# Patient Record
Sex: FEMALE | Race: BLACK OR AFRICAN AMERICAN | Employment: OTHER | URBAN - METROPOLITAN AREA
[De-identification: names, ages, dates, MRNs, and addresses within clinical notes are randomized per-mention and may not be internally consistent; named-entity substitution may affect disease eponyms.]

---

## 2019-04-22 ENCOUNTER — HOSPITAL ENCOUNTER (OUTPATIENT)
Dept: LAB | Age: 84
Discharge: HOME | End: 2019-04-22
Payer: MEDICARE

## 2019-04-22 ENCOUNTER — OFFICE VISIT (OUTPATIENT)
Dept: FAMILY MEDICINE CLINIC | Age: 84
End: 2019-04-22

## 2019-04-22 VITALS
TEMPERATURE: 98 F | HEIGHT: 59 IN | OXYGEN SATURATION: 98 % | BODY MASS INDEX: 27.82 KG/M2 | RESPIRATION RATE: 17 BRPM | HEART RATE: 73 BPM | SYSTOLIC BLOOD PRESSURE: 183 MMHG | DIASTOLIC BLOOD PRESSURE: 74 MMHG | WEIGHT: 138 LBS

## 2019-04-22 DIAGNOSIS — Z96.642 HISTORY OF TOTAL LEFT HIP REPLACEMENT: ICD-10-CM

## 2019-04-22 DIAGNOSIS — R41.3 MEMORY LOSS: ICD-10-CM

## 2019-04-22 DIAGNOSIS — Z13.220 SCREENING FOR HYPERLIPIDEMIA: ICD-10-CM

## 2019-04-22 DIAGNOSIS — R68.89 COLD INTOLERANCE: ICD-10-CM

## 2019-04-22 DIAGNOSIS — R63.5 WEIGHT GAIN: ICD-10-CM

## 2019-04-22 DIAGNOSIS — I10 ESSENTIAL HYPERTENSION: ICD-10-CM

## 2019-04-22 DIAGNOSIS — R22.43 LOCALIZED SWELLING OF BOTH LOWER LEGS: ICD-10-CM

## 2019-04-22 DIAGNOSIS — L84 CALLUS OF FOOT: ICD-10-CM

## 2019-04-22 DIAGNOSIS — M17.12 OSTEOARTHRITIS OF LEFT KNEE, UNSPECIFIED OSTEOARTHRITIS TYPE: ICD-10-CM

## 2019-04-22 DIAGNOSIS — I10 ESSENTIAL HYPERTENSION: Primary | ICD-10-CM

## 2019-04-22 LAB
ALBUMIN SERPL-MCNC: 3.7 G/DL (ref 3.4–5)
ALBUMIN/GLOB SERPL: 1.1 {RATIO} (ref 0.8–1.7)
ALP SERPL-CCNC: 84 U/L (ref 45–117)
ALT SERPL-CCNC: 14 U/L (ref 13–56)
ANION GAP SERPL CALC-SCNC: 7 MMOL/L (ref 3–18)
AST SERPL-CCNC: 13 U/L (ref 15–37)
BASOPHILS # BLD: 0 K/UL (ref 0–0.1)
BASOPHILS NFR BLD: 0 % (ref 0–2)
BILIRUB SERPL-MCNC: 0.3 MG/DL (ref 0.2–1)
BUN SERPL-MCNC: 13 MG/DL (ref 7–18)
BUN/CREAT SERPL: 15 (ref 12–20)
CALCIUM SERPL-MCNC: 8.7 MG/DL (ref 8.5–10.1)
CHLORIDE SERPL-SCNC: 106 MMOL/L (ref 100–108)
CHOLEST SERPL-MCNC: 193 MG/DL
CO2 SERPL-SCNC: 26 MMOL/L (ref 21–32)
CREAT SERPL-MCNC: 0.87 MG/DL (ref 0.6–1.3)
DIFFERENTIAL METHOD BLD: ABNORMAL
EOSINOPHIL # BLD: 0 K/UL (ref 0–0.4)
EOSINOPHIL NFR BLD: 1 % (ref 0–5)
ERYTHROCYTE [DISTWIDTH] IN BLOOD BY AUTOMATED COUNT: 14.9 % (ref 11.6–14.5)
GLOBULIN SER CALC-MCNC: 3.5 G/DL (ref 2–4)
GLUCOSE SERPL-MCNC: 88 MG/DL (ref 74–99)
HCT VFR BLD AUTO: 39.1 % (ref 35–45)
HDLC SERPL-MCNC: 54 MG/DL (ref 40–60)
HDLC SERPL: 3.6 {RATIO} (ref 0–5)
HGB BLD-MCNC: 12.2 G/DL (ref 12–16)
LDLC SERPL CALC-MCNC: 125.4 MG/DL (ref 0–100)
LIPID PROFILE,FLP: ABNORMAL
LYMPHOCYTES # BLD: 1.1 K/UL (ref 0.9–3.6)
LYMPHOCYTES NFR BLD: 19 % (ref 21–52)
MCH RBC QN AUTO: 28.7 PG (ref 24–34)
MCHC RBC AUTO-ENTMCNC: 31.2 G/DL (ref 31–37)
MCV RBC AUTO: 92 FL (ref 74–97)
MONOCYTES # BLD: 0.4 K/UL (ref 0.05–1.2)
MONOCYTES NFR BLD: 7 % (ref 3–10)
NEUTS SEG # BLD: 4.4 K/UL (ref 1.8–8)
NEUTS SEG NFR BLD: 73 % (ref 40–73)
PLATELET # BLD AUTO: 276 K/UL (ref 135–420)
PMV BLD AUTO: 12.3 FL (ref 9.2–11.8)
POTASSIUM SERPL-SCNC: 4.1 MMOL/L (ref 3.5–5.5)
PROT SERPL-MCNC: 7.2 G/DL (ref 6.4–8.2)
RBC # BLD AUTO: 4.25 M/UL (ref 4.2–5.3)
SODIUM SERPL-SCNC: 139 MMOL/L (ref 136–145)
TRIGL SERPL-MCNC: 68 MG/DL (ref ?–150)
VLDLC SERPL CALC-MCNC: 13.6 MG/DL
WBC # BLD AUTO: 6.1 K/UL (ref 4.6–13.2)

## 2019-04-22 PROCEDURE — 82607 VITAMIN B-12: CPT

## 2019-04-22 PROCEDURE — 85025 COMPLETE CBC W/AUTO DIFF WBC: CPT

## 2019-04-22 PROCEDURE — 80053 COMPREHEN METABOLIC PANEL: CPT

## 2019-04-22 PROCEDURE — 36415 COLL VENOUS BLD VENIPUNCTURE: CPT

## 2019-04-22 PROCEDURE — 84443 ASSAY THYROID STIM HORMONE: CPT

## 2019-04-22 PROCEDURE — 80061 LIPID PANEL: CPT

## 2019-04-22 NOTE — PROGRESS NOTES
Lata Boyd Chief Complaint Patient presents with [facility] Establish Care  Physical  
 
Vitals:  
 04/22/19 1128 BP: 183/74 Pulse: 73 Resp: 17 Temp: 98 °F (36.7 °C) TempSrc: Oral  
SpO2: 98% Weight: 138 lb (62.6 kg) Height: 4' 11\" (1.499 m) PainSc:   0 - No pain HPI: Patient is here to establish care she is accompanied by her niece who is currently taking care of her, Sharyle Breeze, patient was living in [place] after this last September 2018, when her niece brought her to [facility] and start taking care of her, last year patient fell and had a left hip fracture and she had a total hip replacement. Patient condition is not deteriorating as far as her memory and taking care of herself so she was brought to [facility] to stay with her niece. Upon questioning the patient she could not tell the date, neither the day of the week and not able to save the name of the current President of the [place]. Pleasant and she had no complaint today except from occasional pain on her left knee. Noted that patient had gained about 30 pounds in the last 6 months, and patient had good appetite, cold intolerance. Also her knees noticed swelling in her lower extremities. Patient has no shortness of breath no wheezing no constipation and has seen an ophthalmologist evaluate that 2 months ago. At that [place] [facility] in [place] Past Medical History:  
Diagnosis Date  Arthritis Past Surgical History:  
Procedure Laterality Date  HX HIP REPLACEMENT Social History Tobacco Use  Smoking status: Never Smoker  Smokeless tobacco: Never Used Substance Use Topics  Alcohol use: Not Currently Family History Problem Relation Age of Onset  No Known Problems Mother  No Known Problems Father Review of Systems Constitutional: Negative for chills, fever, malaise/fatigue and weight loss. HENT: Positive for congestion. Negative for ear discharge, ear pain, hearing loss, nosebleeds, sinus pain and sore throat. Eyes: Negative for blurred vision, double vision and discharge. Respiratory: Negative for cough, hemoptysis, sputum production and shortness of breath. Cardiovascular: Negative for chest pain, palpitations, claudication and leg swelling. Gastrointestinal: Negative for abdominal pain, constipation, diarrhea, nausea and vomiting. Genitourinary: Negative for dysuria, frequency, hematuria and urgency. Musculoskeletal: Positive for joint pain. Negative for myalgias. Skin: Negative for itching and rash. Neurological: Negative for dizziness, tingling, sensory change, speech change, focal weakness, weakness and headaches. Psychiatric/Behavioral: Positive for memory loss. Negative for depression and suicidal ideas. The patient does not have insomnia. Physical Exam  
Constitutional: She appears well-developed and well-nourished. No distress. HENT:  
Head: Normocephalic and atraumatic. Mouth/Throat: Oropharynx is clear and moist. No oropharyngeal exudate. Eyes: Pupils are equal, round, and reactive to light. Conjunctivae are normal. Right eye exhibits no discharge. Left eye exhibits no discharge. No scleral icterus. Neck: Normal range of motion. Neck supple. No thyromegaly present. Cardiovascular: Normal rate, regular rhythm and normal heart sounds. No murmur heard. Pulmonary/Chest: Effort normal and breath sounds normal. No respiratory distress. She has no wheezes. She has no rales. She exhibits no tenderness. Abdominal: Soft. She exhibits no distension. There is no tenderness. There is no rebound. Musculoskeletal: Normal range of motion. She exhibits edema. She exhibits no tenderness or deformity. Patient using a walker Lymphadenopathy:  
  She has no cervical adenopathy. Neurological: She is alert. No cranial nerve deficit.  Coordination normal.  
 Skin: Skin is warm and dry. No rash noted. She is not diaphoretic. No erythema. No pallor. Patient has 2 painful calluses on the right plantar surface of the foot one is about 2 cm x 1 cm and the other one is 1 cm in diameter. Bilateral nails look thickened and brittle Psychiatric: She has a normal mood and affect. Her behavior is normal. Judgment and thought content normal.  
Nursing note and vitals reviewed. Assessment and plan  
 
Plan of care has been discussed with the patient, he agrees to the plan and verbalized understanding. All his questions were answered More than 50% of the time spent in this visit was counseling the patient about  illness and treatment options 1. Essential hypertension Patient is not on any medication currently will monitor blood pressure for next visit - METABOLIC PANEL, COMPREHENSIVE; Future - CBC WITH AUTOMATED DIFF; Future 2. Cold intolerance 
 
- THYROID CASCADE PROFILE; Future - CBC WITH AUTOMATED DIFF; Future 3. Weight gain 
 
- THYROID CASCADE PROFILE; Future - CBC WITH AUTOMATED DIFF; Future 4. Callus of foot 
 
- REFERRAL TO PODIATRY 5. Memory loss 
 
- REFERRAL TO GERIATRICS 
- CBC WITH AUTOMATED DIFF; Future 6. Localized swelling of both lower legs 
 
- CBC WITH AUTOMATED DIFF; Future 7. Screening for hyperlipidemia - LIPID PANEL; Future 8. History of total left hip replacement 9. Osteoarthritis of left knee, unspecified osteoarthritis type There are no active problems to display for this patient. No results found for this or any previous visit. No results found for any previous visit. Follow-up and Dispositions · Return in about 2 weeks (around 5/6/2019) for medicare wellness and BP check .

## 2019-04-22 NOTE — PROGRESS NOTES
Julianpérezjanina Triana is a 80 y.o. female presents to office for establish care and physical 
 
Medication list has been reviewed with Hawk Triana and updated as of today's date Health Maintenance items with a due date reviewed with patient: 
Health Maintenance Due Topic Date Due  
 DTaP/Tdap/Td series (1 - Tdap) 11/13/1942  Shingrix Vaccine Age 50> (1 of 2) 11/13/1971  GLAUCOMA SCREENING Q2Y  11/13/1986  Bone Densitometry (Dexa) Screening  11/13/1986  Pneumococcal 65+ years (1 of 2 - PCV13) 11/13/1986

## 2019-04-23 LAB
TSH SERPL-ACNC: 1.51 UIU/ML (ref 0.36–3.74)
VIT B12 SERPL-MCNC: 282 PG/ML (ref 211–911)

## 2019-05-09 ENCOUNTER — OFFICE VISIT (OUTPATIENT)
Dept: FAMILY MEDICINE CLINIC | Age: 84
End: 2019-05-09

## 2019-05-09 VITALS
BODY MASS INDEX: 28.22 KG/M2 | TEMPERATURE: 97.7 F | RESPIRATION RATE: 18 BRPM | OXYGEN SATURATION: 100 % | WEIGHT: 140 LBS | HEART RATE: 76 BPM | SYSTOLIC BLOOD PRESSURE: 178 MMHG | HEIGHT: 59 IN | DIASTOLIC BLOOD PRESSURE: 82 MMHG

## 2019-05-09 DIAGNOSIS — I10 ESSENTIAL HYPERTENSION: Primary | ICD-10-CM

## 2019-05-09 DIAGNOSIS — E78.2 MIXED HYPERLIPIDEMIA: ICD-10-CM

## 2019-05-09 DIAGNOSIS — F03.90 DEMENTIA WITHOUT BEHAVIORAL DISTURBANCE, UNSPECIFIED DEMENTIA TYPE: ICD-10-CM

## 2019-05-09 NOTE — PROGRESS NOTES
Yeni Ohara is a 80 y.o. female presents to office for htn    Medication list has been reviewed with Yeni Ohara and updated as of today's date     Health Maintenance items with a due date reviewed with patient:  Health Maintenance Due   Topic Date Due    DTaP/Tdap/Td series (1 - Tdap) 11/13/1942    Shingrix Vaccine Age 50> (1 of 2) 11/13/1971    GLAUCOMA SCREENING Q2Y  11/13/1986    Bone Densitometry (Dexa) Screening  11/13/1986    Pneumococcal 65+ years (1 of 2 - PCV13) 11/13/1986    MEDICARE YEARLY EXAM  04/22/2019

## 2019-05-11 PROBLEM — F03.90 DEMENTIA WITHOUT BEHAVIORAL DISTURBANCE (HCC): Status: ACTIVE | Noted: 2019-05-11

## 2019-05-11 PROBLEM — E78.2 MIXED HYPERLIPIDEMIA: Status: ACTIVE | Noted: 2019-05-11

## 2019-05-11 PROBLEM — I10 ESSENTIAL HYPERTENSION: Status: ACTIVE | Noted: 2019-05-11

## 2019-05-11 NOTE — PROGRESS NOTES
Esteban Davenport     Chief Complaint   Patient presents with   [facility] Elevated Blood Pressure     Vitals:    05/09/19 1147   BP: 178/82   Pulse: 76   Resp: 18   Temp: 97.7 °F (36.5 °C)   TempSrc: Oral   SpO2: 100%   Weight: 140 lb (63.5 kg)   Height: 4' 11\" (1.499 m)   PainSc:   0 - No pain         HPI: Patient is here for follow-up she has been accompanied by her niece ,no complains. Blood pressure still elevated today she has no complaints related to hypertension no headache, no dizziness, no chest pain no blurred vision. Lab results in the normal limit except mildly elevated LDL. Past Medical History:   Diagnosis Date    Arthritis      Past Surgical History:   Procedure Laterality Date    HX HIP REPLACEMENT       Social History     Tobacco Use    Smoking status: Never Smoker    Smokeless tobacco: Never Used   Substance Use Topics    Alcohol use: Not Currently       Family History   Problem Relation Age of Onset    No Known Problems Mother     No Known Problems Father        Review of Systems   Constitutional: Negative for chills, fever, malaise/fatigue and weight loss. HENT: Negative for congestion, ear discharge, ear pain, hearing loss and nosebleeds. Eyes: Negative for blurred vision, double vision and discharge. Respiratory: Negative for cough. Cardiovascular: Negative for chest pain, palpitations, claudication and leg swelling. Gastrointestinal: Negative for abdominal pain, constipation, diarrhea, nausea and vomiting. Genitourinary: Negative for dysuria, frequency and urgency. Musculoskeletal: Negative for myalgias. Skin: Negative for itching and rash. Neurological: Negative for dizziness, tingling, sensory change, speech change, focal weakness, weakness and headaches. Psychiatric/Behavioral: Negative for depression and suicidal ideas. Physical Exam   Constitutional: She appears well-developed and well-nourished. No distress.    HENT:   Head: Normocephalic and atraumatic. Mouth/Throat: Oropharynx is clear and moist. No oropharyngeal exudate. Eyes: Pupils are equal, round, and reactive to light. Conjunctivae are normal. Right eye exhibits no discharge. Left eye exhibits no discharge. No scleral icterus. Cardiovascular: Normal rate, regular rhythm and normal heart sounds. Pulmonary/Chest: Effort normal.   Abdominal: Soft. Musculoskeletal: Normal range of motion. She exhibits no deformity. Neurological: She is alert. Skin: Skin is warm and dry. She is not diaphoretic. Psychiatric: She has a normal mood and affect. Her behavior is normal. Judgment and thought content normal.   Nursing note and vitals reviewed. Assessment and plan     Plan of care has been discussed with the patient, he agrees to the plan and verbalized understanding. All his questions were answered  More than 50% of the time spent in this visit was counseling the patient about  illness and treatment options         1. Essential Hypertension       Systolic hypertension, consider starting ARB if BP is elevated for the 3rd reading in 3 weeks     2. Mixed hyperlipidemia      No statin   3. Dementia without behavioral disturbance, unspecified dementia type   will be seen at the [facility] .         Patient Active Problem List    Diagnosis Date Noted    Essential hypertension 05/11/2019    Mixed hyperlipidemia 05/11/2019    Dementia without behavioral disturbance 05/11/2019     Results for orders placed or performed during the hospital encounter of 88/81/07   METABOLIC PANEL, COMPREHENSIVE   Result Value Ref Range    Sodium 139 136 - 145 mmol/L    Potassium 4.1 3.5 - 5.5 mmol/L    Chloride 106 100 - 108 mmol/L    CO2 26 21 - 32 mmol/L    Anion gap 7 3.0 - 18 mmol/L    Glucose 88 74 - 99 mg/dL    BUN 13 7.0 - 18 MG/DL    Creatinine 0.87 0.6 - 1.3 MG/DL    BUN/Creatinine ratio 15 12 - 20      GFR est AA >60 >60 ml/min/1.73m2    GFR est non-AA >60 >60 ml/min/1.73m2    Calcium 8.7 8.5 - 10.1 MG/DL    Bilirubin, total 0.3 0.2 - 1.0 MG/DL    ALT (SGPT) 14 13 - 56 U/L    AST (SGOT) 13 (L) 15 - 37 U/L    Alk. phosphatase 84 45 - 117 U/L    Protein, total 7.2 6.4 - 8.2 g/dL    Albumin 3.7 3.4 - 5.0 g/dL    Globulin 3.5 2.0 - 4.0 g/dL    A-G Ratio 1.1 0.8 - 1.7     CBC WITH AUTOMATED DIFF   Result Value Ref Range    WBC 6.1 4.6 - 13.2 K/uL    RBC 4.25 4.20 - 5.30 M/uL    HGB 12.2 12.0 - 16.0 g/dL    HCT 39.1 35.0 - 45.0 %    MCV 92.0 74.0 - 97.0 FL    MCH 28.7 24.0 - 34.0 PG    MCHC 31.2 31.0 - 37.0 g/dL    RDW 14.9 (H) 11.6 - 14.5 %    PLATELET 691 936 - 989 K/uL    MPV 12.3 (H) 9.2 - 11.8 FL    NEUTROPHILS 73 40 - 73 %    LYMPHOCYTES 19 (L) 21 - 52 %    MONOCYTES 7 3 - 10 %    EOSINOPHILS 1 0 - 5 %    BASOPHILS 0 0 - 2 %    ABS. NEUTROPHILS 4.4 1.8 - 8.0 K/UL    ABS. LYMPHOCYTES 1.1 0.9 - 3.6 K/UL    ABS. MONOCYTES 0.4 0.05 - 1.2 K/UL    ABS. EOSINOPHILS 0.0 0.0 - 0.4 K/UL    ABS.  BASOPHILS 0.0 0.0 - 0.1 K/UL    DF AUTOMATED     LIPID PANEL   Result Value Ref Range    LIPID PROFILE          Cholesterol, total 193 <200 MG/DL    Triglyceride 68 <150 MG/DL    HDL Cholesterol 54 40 - 60 MG/DL    LDL, calculated 125.4 (H) 0 - 100 MG/DL    VLDL, calculated 13.6 MG/DL    CHOL/HDL Ratio 3.6 0 - 5.0     VITAMIN B12   Result Value Ref Range    Vitamin B12 282 211 - 911 pg/mL   TSH W/ REFLX FREE T4 IF ABNORMAL   Result Value Ref Range    TSH 1.51 0.36 - 3.74 uIU/mL     Hospital Outpatient Visit on 04/22/2019   Component Date Value Ref Range Status    Sodium 04/22/2019 139  136 - 145 mmol/L Final    Potassium 04/22/2019 4.1  3.5 - 5.5 mmol/L Final    Chloride 04/22/2019 106  100 - 108 mmol/L Final    CO2 04/22/2019 26  21 - 32 mmol/L Final    Anion gap 04/22/2019 7  3.0 - 18 mmol/L Final    Glucose 04/22/2019 88  74 - 99 mg/dL Final    BUN 04/22/2019 13  7.0 - 18 MG/DL Final    Creatinine 04/22/2019 0.87  0.6 - 1.3 MG/DL Final    BUN/Creatinine ratio 04/22/2019 15  12 - 20   Final    GFR est AA 04/22/2019 >60 >60 ml/min/1.73m2 Final    GFR est non-AA 04/22/2019 >60  >60 ml/min/1.73m2 Final    Comment: (NOTE)  Estimated GFR is calculated using the Modification of Diet in Renal   Disease (MDRD) Study equation, reported for both  Americans   (GFRAA) and non- Americans (GFRNA), and normalized to 1.73m2   body surface area. The physician must decide which value applies to   the patient. The MDRD study equation should only be used in   individuals age 25 or older. It has not been validated for the   following: pregnant women, patients with serious comorbid conditions,   or on certain medications, or persons with extremes of body size,   muscle mass, or nutritional status.  Calcium 04/22/2019 8.7  8.5 - 10.1 MG/DL Final    Bilirubin, total 04/22/2019 0.3  0.2 - 1.0 MG/DL Final    ALT (SGPT) 04/22/2019 14  13 - 56 U/L Final    AST (SGOT) 04/22/2019 13* 15 - 37 U/L Final    Alk. phosphatase 04/22/2019 84  45 - 117 U/L Final    Protein, total 04/22/2019 7.2  6.4 - 8.2 g/dL Final    Albumin 04/22/2019 3.7  3.4 - 5.0 g/dL Final    Globulin 04/22/2019 3.5  2.0 - 4.0 g/dL Final    A-G Ratio 04/22/2019 1.1  0.8 - 1.7   Final    WBC 04/22/2019 6.1  4.6 - 13.2 K/uL Final    RBC 04/22/2019 4.25  4.20 - 5.30 M/uL Final    HGB 04/22/2019 12.2  12.0 - 16.0 g/dL Final    HCT 04/22/2019 39.1  35.0 - 45.0 % Final    MCV 04/22/2019 92.0  74.0 - 97.0 FL Final    MCH 04/22/2019 28.7  24.0 - 34.0 PG Final    MCHC 04/22/2019 31.2  31.0 - 37.0 g/dL Final    RDW 04/22/2019 14.9* 11.6 - 14.5 % Final    PLATELET 14/45/7233 995  135 - 420 K/uL Final    MPV 04/22/2019 12.3* 9.2 - 11.8 FL Final    NEUTROPHILS 04/22/2019 73  40 - 73 % Final    LYMPHOCYTES 04/22/2019 19* 21 - 52 % Final    MONOCYTES 04/22/2019 7  3 - 10 % Final    EOSINOPHILS 04/22/2019 1  0 - 5 % Final    BASOPHILS 04/22/2019 0  0 - 2 % Final    ABS. NEUTROPHILS 04/22/2019 4.4  1.8 - 8.0 K/UL Final    ABS.  LYMPHOCYTES 04/22/2019 1.1  0.9 - 3.6 K/UL Final    ABS. MONOCYTES 04/22/2019 0.4  0.05 - 1.2 K/UL Final    ABS. EOSINOPHILS 04/22/2019 0.0  0.0 - 0.4 K/UL Final    ABS. BASOPHILS 04/22/2019 0.0  0.0 - 0.1 K/UL Final    DF 04/22/2019 AUTOMATED    Final    LIPID PROFILE 04/22/2019        Final    Cholesterol, total 04/22/2019 193  <200 MG/DL Final    Triglyceride 04/22/2019 68  <150 MG/DL Final    Comment: The drugs N-acetylcysteine (NAC) and  Metamiszole have been found to cause falsely  low results in this chemical assay. Please  be sure to submit blood samples obtained  BEFORE administration of either of these  drugs to assure correct results.  HDL Cholesterol 04/22/2019 54  40 - 60 MG/DL Final    LDL, calculated 04/22/2019 125.4* 0 - 100 MG/DL Final    VLDL, calculated 04/22/2019 13.6  MG/DL Final    CHOL/HDL Ratio 04/22/2019 3.6  0 - 5.0   Final    Vitamin B12 04/22/2019 282  211 - 911 pg/mL Final    TSH 04/22/2019 1.51  0.36 - 3.74 uIU/mL Final          Follow-up and Dispositions    · Return for 3 weeks follow up for nurse viist for BP .

## 2019-05-20 ENCOUNTER — OFFICE VISIT (OUTPATIENT)
Dept: FAMILY MEDICINE CLINIC | Age: 84
End: 2019-05-20

## 2019-05-20 VITALS
HEART RATE: 84 BPM | HEIGHT: 59 IN | RESPIRATION RATE: 17 BRPM | WEIGHT: 141 LBS | OXYGEN SATURATION: 99 % | DIASTOLIC BLOOD PRESSURE: 73 MMHG | SYSTOLIC BLOOD PRESSURE: 162 MMHG | TEMPERATURE: 97.6 F | BODY MASS INDEX: 28.43 KG/M2

## 2019-05-20 DIAGNOSIS — I10 SYSTOLIC HYPERTENSION: ICD-10-CM

## 2019-05-20 DIAGNOSIS — Z96.642 HISTORY OF LEFT HIP REPLACEMENT: ICD-10-CM

## 2019-05-20 DIAGNOSIS — I10 HYPERTENSION, UNSPECIFIED TYPE: Primary | ICD-10-CM

## 2019-05-20 DIAGNOSIS — F01.50 VASCULAR DEMENTIA WITHOUT BEHAVIORAL DISTURBANCE (HCC): ICD-10-CM

## 2019-05-20 DIAGNOSIS — M25.552 LEFT HIP PAIN: ICD-10-CM

## 2019-05-20 RX ORDER — LOSARTAN POTASSIUM 25 MG/1
25 TABLET ORAL DAILY
Qty: 90 TAB | Refills: 0 | Status: SHIPPED | OUTPATIENT
Start: 2019-05-20 | End: 2019-06-17 | Stop reason: DRUGHIGH

## 2019-05-20 NOTE — PATIENT INSTRUCTIONS
Piriformis Syndrome: Care Instructions  Your Care Instructions    The piriformis muscle is deep under your rear end (buttock). One end of the muscle connects deep inside the pelvic area, and the other end attaches to the top of the thighbone. This muscle can press on the sciatic nerve that runs from your spine down your leg. When this happens, you may have pain, numbness, and tingling in the buttock and down the back of your leg. This is called piriformis syndrome. The pain may get worse when you sit for a long time or climb stairs. Also, you may be more likely to develop piriformis syndrome if you run or walk often. Your doctor will check for other causes of your pain before treating this syndrome. Treatment may include stretching exercises, massage, and medicine for the pain and swelling. If these do not help, you may get a shot of steroid medicine. Until the pain is gone, you may need to rest the muscle and limit activities like running. Exercises and a change in how you move and sit may be enough to stop the pressure on the nerve. Follow-up care is a key part of your treatment and safety. Be sure to make and go to all appointments, and call your doctor if you are having problems. It's also a good idea to know your test results and keep a list of the medicines you take. How can you care for yourself at home? · If your doctor thinks that strenuous exercise is causing your problem, stop or cut back on activities such as running. You may find swimming to be a good exercise for a while. · Stretch the piriformis muscle. ? Lie on your back. ? Bend one leg at the knee and keep the other leg flat on the ground. ? Raise your bent knee up and then move it across your body. Hold the outside of the knee with the opposite hand. ? Gently pull the knee with your hand toward the opposite shoulder. ? Hold the stretch for at least 15 to 30 seconds. Switch legs. ? Do the stretch several times each day.   · Massage the muscle to relieve pressure. ? Sit on the floor. Lean to one side so that the hip on your sore side is off the ground. Put a tennis ball under your buttock on that side. ? As you put weight onto the tennis ball, you may find spots that are especially sore. Move gently so that the tennis ball gently massages each of the sore spots. · Use ice or heat to help reduce pain. Put ice or a cold pack or a heating pad set on low or a warm cloth on the sore area for 10 to 20 minutes at a time. Put a thin cloth between the ice pack or heating pad and your skin. · Ask your doctor if you can take an over-the-counter pain medicine, such as acetaminophen (Tylenol), ibuprofen (Advil, Motrin), or naproxen (Aleve). Be safe with medicines. Read and follow all instructions on the label. · Have your doctor or a physical therapist watch how you move. You may need physical therapy or special inserts in your shoes (orthotics) to help you move in a way that does not put pressure on your nerves. When should you call for help? Watch closely for changes in your health, and be sure to contact your doctor if:    · You do not feel better after several weeks of home care.     · Your pain gets worse.     · Your leg becomes weak or numb. Where can you learn more? Go to http://joey-yesenia.info/. Enter L533 in the search box to learn more about \"Piriformis Syndrome: Care Instructions. \"  Current as of: September 20, 2018  Content Version: 11.9  © 8222-4131 [employer]. Care instructions adapted under license by [employer] (which disclaims liability or warranty for this information). If you have questions about a medical condition or this instruction, always ask your healthcare professional. [address] any warranty or liability for your use of this information.          Piriformis Syndrome: Exercises  Your Care Instructions  Here are some examples of typical rehabilitation exercises for your condition. Start each exercise slowly. Ease off the exercise if you start to have pain. Your doctor or physical therapist will tell you when you can start these exercises and which ones will work best for you. How to do the exercises  Hip rotator stretch    1. Lie on your back with both knees bent and your feet flat on the floor. 2. Put the ankle of your affected leg on your opposite thigh near your knee. 3. Use your hand to gently push your knee (on your affected leg) away from your body until you feel a gentle stretch around your hip. 4. Hold the stretch for 15 to 30 seconds. 5. Repeat 2 to 4 times. 6. Switch legs and repeat steps 1 through 5. Piriformis stretch    1. Lie on your back with your legs straight. 2. Lift your affected leg and bend your knee. With your opposite hand, reach across your body, and then gently pull your knee toward your opposite shoulder. 3. Hold the stretch for 15 to 30 seconds. 4. Repeat with your other leg. 5. Repeat 2 to 4 times on each side. Lower abdominal strengthening    1. Lie on your back with your knees bent and your feet flat on the floor. 2. Tighten your belly muscles by pulling your belly button in toward your spine. 3. Lift one foot off the floor and bring your knee toward your chest, so that your knee is straight above your hip and your leg is bent like the letter \"L. \"  4. Lift the other knee up to the same position. 5. Lower one leg at a time to the starting position. 6. Keep alternating legs until you have lifted each leg 8 to 12 times. 7. Be sure to keep your belly muscles tight and your back still as you are moving your legs. Be sure to breathe normally. Follow-up care is a key part of your treatment and safety. Be sure to make and go to all appointments, and call your doctor if you are having problems. It's also a good idea to know your test results and keep a list of the medicines you take.   Current as of: September 20, 2018  Content Version: 11.9  © 1494-1353 [employer]. Care instructions adapted under license by [employer] (which disclaims liability or warranty for this information). If you have questions about a medical condition or this instruction, always ask your healthcare professional. [address] any warranty or liability for your use of this information.

## 2019-05-20 NOTE — PROGRESS NOTES
Familia Keenan     Chief Complaint   Patient presents with    Hypertension     Vitals:    05/20/19 0934   BP: 162/73   Pulse: 84   Resp: 17   Temp: 97.6 °F (36.4 °C)   TempSrc: Oral   SpO2: 99%   Weight: 141 lb (64 kg)   Height: 4' 11\" (1.499 m)   PainSc:   6         HPI: Patient is here for follow-up on high blood pressure, she has no symptoms no headache no nausea no vomiting no abdominal pain. She has been having pain in the left hip, she has a history of left hip last year    Pain in the hip when she first wake up in the morning, it does not hurt while she is walking, some of the history was taken from patient knees since patient is not consistent with her complaint of dementia, as per the niece the pain is relieved by Tylenol as needed. Patient did have an appointment at the [facility] next month    Past Medical History:   Diagnosis Date    Arthritis      Past Surgical History:   Procedure Laterality Date    HX HIP REPLACEMENT       Social History     Tobacco Use    Smoking status: Never Smoker    Smokeless tobacco: Never Used   Substance Use Topics    Alcohol use: Not Currently       Family History   Problem Relation Age of Onset    No Known Problems Mother     No Known Problems Father        Review of Systems   Constitutional: Negative for chills, fever, malaise/fatigue and weight loss. HENT: Negative for congestion, ear discharge, ear pain, hearing loss, nosebleeds and sinus pain. Eyes: Negative for blurred vision, double vision and discharge. Respiratory: Negative for cough. Cardiovascular: Negative for chest pain, palpitations, claudication and leg swelling. Gastrointestinal: Negative for abdominal pain, constipation, diarrhea, nausea and vomiting. Genitourinary: Negative for dysuria, frequency and urgency. Musculoskeletal: Positive for joint pain. Negative for myalgias. Skin: Negative for itching and rash.    Neurological: Negative for dizziness, tingling, sensory change, speech change, focal weakness, weakness and headaches. Physical Exam   Constitutional: She is oriented to person, place, and time. She appears well-developed and well-nourished. No distress. HENT:   Head: Normocephalic and atraumatic. Mouth/Throat: Oropharynx is clear and moist. No oropharyngeal exudate. Eyes: Pupils are equal, round, and reactive to light. Conjunctivae are normal. Right eye exhibits no discharge. Left eye exhibits no discharge. No scleral icterus. Neck: Normal range of motion. Neck supple. No thyromegaly present. Cardiovascular: Normal rate, regular rhythm and normal heart sounds. No murmur heard. Pulmonary/Chest: Effort normal and breath sounds normal. No respiratory distress. She has no wheezes. She has no rales. She exhibits no tenderness. Abdominal: Soft. She exhibits no distension. There is no tenderness. There is no rebound. Musculoskeletal: Normal range of motion. She exhibits tenderness. She exhibits no edema or deformity. Lymphadenopathy:     She has no cervical adenopathy. Neurological: She is alert and oriented to person, place, and time. Coordination abnormal.   Using walker    Skin: Skin is warm and dry. No rash noted. She is not diaphoretic. No erythema. No pallor. Psychiatric: She has a normal mood and affect. Her behavior is normal. Judgment and thought content normal.   Nursing note and vitals reviewed. Assessment and plan     Plan of care has been discussed with the patient, he agrees to the plan and verbalized understanding. All his questions were answered  More than 50% of the time spent in this visit was counseling the patient about  illness and treatment options         1. Hypertension, unspecified type  Start losartan for systolic hypertension    Follow-up in 1 month for recheck blood pressure  - losartan (COZAAR) 25 mg tablet; Take 1 Tab by mouth daily. Dispense: 90 Tab; Refill: 0    2.  Left hip pain  We will get an x-ray of the left hip but from examination it looks like a submuscular pain due to possible piriformis syndrome stretches were given to the niece       If there is no improvement and if x-ray is unremarkable will be referred to physical therapy  - XR HIP LT W OR WO PELV 2-3 VWS; Future    3. Vascular dementia without behavioral disturbance  Patient is awaiting evaluation by the grandson    4. History of left hip replacement      5. Systolic hypertension      Current Outpatient Medications   Medication Sig Dispense Refill    losartan (COZAAR) 25 mg tablet Take 1 Tab by mouth daily. 90 Tab 0       Patient Active Problem List    Diagnosis Date Noted    Left hip pain 05/20/2019    History of left hip replacement 19/08/3198    Systolic hypertension 74/78/2426    Mixed hyperlipidemia 05/11/2019    Dementia without behavioral disturbance 05/11/2019     Results for orders placed or performed during the hospital encounter of 02/75/52   METABOLIC PANEL, COMPREHENSIVE   Result Value Ref Range    Sodium 139 136 - 145 mmol/L    Potassium 4.1 3.5 - 5.5 mmol/L    Chloride 106 100 - 108 mmol/L    CO2 26 21 - 32 mmol/L    Anion gap 7 3.0 - 18 mmol/L    Glucose 88 74 - 99 mg/dL    BUN 13 7.0 - 18 MG/DL    Creatinine 0.87 0.6 - 1.3 MG/DL    BUN/Creatinine ratio 15 12 - 20      GFR est AA >60 >60 ml/min/1.73m2    GFR est non-AA >60 >60 ml/min/1.73m2    Calcium 8.7 8.5 - 10.1 MG/DL    Bilirubin, total 0.3 0.2 - 1.0 MG/DL    ALT (SGPT) 14 13 - 56 U/L    AST (SGOT) 13 (L) 15 - 37 U/L    Alk.  phosphatase 84 45 - 117 U/L    Protein, total 7.2 6.4 - 8.2 g/dL    Albumin 3.7 3.4 - 5.0 g/dL    Globulin 3.5 2.0 - 4.0 g/dL    A-G Ratio 1.1 0.8 - 1.7     CBC WITH AUTOMATED DIFF   Result Value Ref Range    WBC 6.1 4.6 - 13.2 K/uL    RBC 4.25 4.20 - 5.30 M/uL    HGB 12.2 12.0 - 16.0 g/dL    HCT 39.1 35.0 - 45.0 %    MCV 92.0 74.0 - 97.0 FL    MCH 28.7 24.0 - 34.0 PG    MCHC 31.2 31.0 - 37.0 g/dL    RDW 14.9 (H) 11.6 - 14.5 %    PLATELET 384 001 - 138 K/uL MPV 12.3 (H) 9.2 - 11.8 FL    NEUTROPHILS 73 40 - 73 %    LYMPHOCYTES 19 (L) 21 - 52 %    MONOCYTES 7 3 - 10 %    EOSINOPHILS 1 0 - 5 %    BASOPHILS 0 0 - 2 %    ABS. NEUTROPHILS 4.4 1.8 - 8.0 K/UL    ABS. LYMPHOCYTES 1.1 0.9 - 3.6 K/UL    ABS. MONOCYTES 0.4 0.05 - 1.2 K/UL    ABS. EOSINOPHILS 0.0 0.0 - 0.4 K/UL    ABS. BASOPHILS 0.0 0.0 - 0.1 K/UL    DF AUTOMATED     LIPID PANEL   Result Value Ref Range    LIPID PROFILE          Cholesterol, total 193 <200 MG/DL    Triglyceride 68 <150 MG/DL    HDL Cholesterol 54 40 - 60 MG/DL    LDL, calculated 125.4 (H) 0 - 100 MG/DL    VLDL, calculated 13.6 MG/DL    CHOL/HDL Ratio 3.6 0 - 5.0     VITAMIN B12   Result Value Ref Range    Vitamin B12 282 211 - 911 pg/mL   TSH W/ REFLX FREE T4 IF ABNORMAL   Result Value Ref Range    TSH 1.51 0.36 - 3.74 uIU/mL     Hospital Outpatient Visit on 04/22/2019   Component Date Value Ref Range Status    Sodium 04/22/2019 139  136 - 145 mmol/L Final    Potassium 04/22/2019 4.1  3.5 - 5.5 mmol/L Final    Chloride 04/22/2019 106  100 - 108 mmol/L Final    CO2 04/22/2019 26  21 - 32 mmol/L Final    Anion gap 04/22/2019 7  3.0 - 18 mmol/L Final    Glucose 04/22/2019 88  74 - 99 mg/dL Final    BUN 04/22/2019 13  7.0 - 18 MG/DL Final    Creatinine 04/22/2019 0.87  0.6 - 1.3 MG/DL Final    BUN/Creatinine ratio 04/22/2019 15  12 - 20   Final    GFR est AA 04/22/2019 >60  >60 ml/min/1.73m2 Final    GFR est non-AA 04/22/2019 >60  >60 ml/min/1.73m2 Final    Comment: (NOTE)  Estimated GFR is calculated using the Modification of Diet in Renal   Disease (MDRD) Study equation, reported for both  Americans   (GFRAA) and non- Americans (GFRNA), and normalized to 1.73m2   body surface area. The physician must decide which value applies to   the patient. The MDRD study equation should only be used in   individuals age 25 or older.  It has not been validated for the   following: pregnant women, patients with serious comorbid conditions,   or on certain medications, or persons with extremes of body size,   muscle mass, or nutritional status.  Calcium 04/22/2019 8.7  8.5 - 10.1 MG/DL Final    Bilirubin, total 04/22/2019 0.3  0.2 - 1.0 MG/DL Final    ALT (SGPT) 04/22/2019 14  13 - 56 U/L Final    AST (SGOT) 04/22/2019 13* 15 - 37 U/L Final    Alk. phosphatase 04/22/2019 84  45 - 117 U/L Final    Protein, total 04/22/2019 7.2  6.4 - 8.2 g/dL Final    Albumin 04/22/2019 3.7  3.4 - 5.0 g/dL Final    Globulin 04/22/2019 3.5  2.0 - 4.0 g/dL Final    A-G Ratio 04/22/2019 1.1  0.8 - 1.7   Final    WBC 04/22/2019 6.1  4.6 - 13.2 K/uL Final    RBC 04/22/2019 4.25  4.20 - 5.30 M/uL Final    HGB 04/22/2019 12.2  12.0 - 16.0 g/dL Final    HCT 04/22/2019 39.1  35.0 - 45.0 % Final    MCV 04/22/2019 92.0  74.0 - 97.0 FL Final    MCH 04/22/2019 28.7  24.0 - 34.0 PG Final    MCHC 04/22/2019 31.2  31.0 - 37.0 g/dL Final    RDW 04/22/2019 14.9* 11.6 - 14.5 % Final    PLATELET 40/99/9231 294  135 - 420 K/uL Final    MPV 04/22/2019 12.3* 9.2 - 11.8 FL Final    NEUTROPHILS 04/22/2019 73  40 - 73 % Final    LYMPHOCYTES 04/22/2019 19* 21 - 52 % Final    MONOCYTES 04/22/2019 7  3 - 10 % Final    EOSINOPHILS 04/22/2019 1  0 - 5 % Final    BASOPHILS 04/22/2019 0  0 - 2 % Final    ABS. NEUTROPHILS 04/22/2019 4.4  1.8 - 8.0 K/UL Final    ABS. LYMPHOCYTES 04/22/2019 1.1  0.9 - 3.6 K/UL Final    ABS. MONOCYTES 04/22/2019 0.4  0.05 - 1.2 K/UL Final    ABS. EOSINOPHILS 04/22/2019 0.0  0.0 - 0.4 K/UL Final    ABS. BASOPHILS 04/22/2019 0.0  0.0 - 0.1 K/UL Final    DF 04/22/2019 AUTOMATED    Final    LIPID PROFILE 04/22/2019        Final    Cholesterol, total 04/22/2019 193  <200 MG/DL Final    Triglyceride 04/22/2019 68  <150 MG/DL Final    Comment: The drugs N-acetylcysteine (NAC) and  Metamiszole have been found to cause falsely  low results in this chemical assay.  Please  be sure to submit blood samples obtained  BEFORE administration of either of these  drugs to assure correct results.  HDL Cholesterol 04/22/2019 54  40 - 60 MG/DL Final    LDL, calculated 04/22/2019 125.4* 0 - 100 MG/DL Final    VLDL, calculated 04/22/2019 13.6  MG/DL Final    CHOL/HDL Ratio 04/22/2019 3.6  0 - 5.0   Final    Vitamin B12 04/22/2019 282  211 - 911 pg/mL Final    TSH 04/22/2019 1.51  0.36 - 3.74 uIU/mL Final          Follow-up and Dispositions    · Return in about 1 month (around 6/20/2019) for medicare wellness .

## 2019-05-20 NOTE — PROGRESS NOTES
Ivonne Longo is a 80 y.o. female presents to office for htn    Medication list has been reviewed with Ivonne Longo and updated as of today's date     Health Maintenance items with a due date reviewed with patient:  Health Maintenance Due   Topic Date Due    DTaP/Tdap/Td series (1 - Tdap) 11/13/1942    Shingrix Vaccine Age 50> (1 of 2) 11/13/1971    GLAUCOMA SCREENING Q2Y  11/13/1986    Bone Densitometry (Dexa) Screening  11/13/1986    Pneumococcal 65+ years (1 of 2 - PCV13) 11/13/1986    MEDICARE YEARLY EXAM  04/22/2019

## 2019-06-17 ENCOUNTER — OFFICE VISIT (OUTPATIENT)
Dept: FAMILY MEDICINE CLINIC | Age: 84
End: 2019-06-17

## 2019-06-17 VITALS
DIASTOLIC BLOOD PRESSURE: 80 MMHG | HEIGHT: 59 IN | TEMPERATURE: 97.4 F | HEART RATE: 73 BPM | RESPIRATION RATE: 17 BRPM | BODY MASS INDEX: 27.82 KG/M2 | OXYGEN SATURATION: 99 % | WEIGHT: 138 LBS | SYSTOLIC BLOOD PRESSURE: 170 MMHG

## 2019-06-17 DIAGNOSIS — I10 ESSENTIAL HYPERTENSION: Primary | ICD-10-CM

## 2019-06-17 RX ORDER — LOSARTAN POTASSIUM 50 MG/1
50 TABLET ORAL DAILY
Qty: 90 TAB | Refills: 0 | Status: SHIPPED | OUTPATIENT
Start: 2019-06-17 | End: 2019-07-23 | Stop reason: DRUGHIGH

## 2019-06-17 NOTE — PROGRESS NOTES
Cora Robles Chief Complaint Patient presents with  Annual Exam  
 
Vitals:  
 06/17/19 1053 BP: 170/80 Pulse: 73 Resp: 17 Temp: 97.4 °F (36.3 °C) TempSrc: Oral  
SpO2: 99% Weight: 138 lb (62.6 kg) Height: 4' 11\" (1.499 m) PainSc:   0 - No pain HPI:Pateint is here for follow up on high blood pressure she has been on losartan 25 mg daily for 3 weeks , and she she has no acute complaint today, patient is scheduled to be seen at the [facility] Past Medical History:  
Diagnosis Date  Arthritis Past Surgical History:  
Procedure Laterality Date  HX HIP REPLACEMENT Social History Tobacco Use  Smoking status: Never Smoker  Smokeless tobacco: Never Used Substance Use Topics  Alcohol use: Not Currently Family History Problem Relation Age of Onset  No Known Problems Mother  No Known Problems Father Review of Systems Constitutional: Negative for chills, fever, malaise/fatigue and weight loss. HENT: Negative for congestion, ear discharge, ear pain, hearing loss, nosebleeds and sinus pain. Eyes: Negative for blurred vision, double vision and discharge. Respiratory: Negative for cough. Cardiovascular: Negative for chest pain, palpitations, claudication and leg swelling. Gastrointestinal: Negative for abdominal pain, constipation, diarrhea, nausea and vomiting. Genitourinary: Negative for dysuria, frequency and urgency. Musculoskeletal: Negative for myalgias. Skin: Negative for itching and rash. Neurological: Negative for dizziness, tingling, sensory change, speech change, focal weakness, weakness and headaches. Psychiatric/Behavioral: Negative for depression and suicidal ideas. Physical Exam  
Constitutional: She is oriented to person, place, and time. She appears well-developed and well-nourished. No distress. HENT:  
Head: Normocephalic and atraumatic. Mouth/Throat: Oropharynx is clear and moist. No oropharyngeal exudate. Eyes: Pupils are equal, round, and reactive to light. Conjunctivae are normal. Right eye exhibits no discharge. Left eye exhibits no discharge. No scleral icterus. Neck: Normal range of motion. Neck supple. No thyromegaly present. Cardiovascular: Normal rate, regular rhythm and normal heart sounds. No murmur heard. Pulmonary/Chest: Effort normal and breath sounds normal. No respiratory distress. She has no wheezes. She has no rales. She exhibits no tenderness. Abdominal: Soft. She exhibits no distension. There is no tenderness. There is no rebound. Musculoskeletal: Normal range of motion. She exhibits no edema, tenderness or deformity. Lymphadenopathy:  
  She has no cervical adenopathy. Neurological: She is alert and oriented to person, place, and time. No cranial nerve deficit. Coordination normal.  
Skin: Skin is warm and dry. No rash noted. She is not diaphoretic. No erythema. No pallor. Psychiatric: She has a normal mood and affect. Her behavior is normal. Judgment and thought content normal.  
Nursing note and vitals reviewed. Assessment and plan  
 
Plan of care has been discussed with the patient, he agrees to the plan and verbalized understanding. All his questions were answered More than 50% of the time spent in this visit was counseling the patient about  illness and treatment options 1. Essential hypertension To increase losartan to 50 mg daily and follow-up in 3 months 
- losartan (COZAAR) 50 mg tablet; Take 1 Tab by mouth daily. Dispense: 90 Tab; Refill: 0 Current Outpatient Medications Medication Sig Dispense Refill  losartan (COZAAR) 50 mg tablet Take 1 Tab by mouth daily. 90 Tab 0 Patient Active Problem List  
 Diagnosis Date Noted  Left hip pain 05/20/2019  
 History of left hip replacement 05/20/2019  Systolic hypertension 58/89/4129  Mixed hyperlipidemia 05/11/2019  Dementia without behavioral disturbance 05/11/2019 Results for orders placed or performed during the hospital encounter of 04/22/19 METABOLIC PANEL, COMPREHENSIVE Result Value Ref Range Sodium 139 136 - 145 mmol/L Potassium 4.1 3.5 - 5.5 mmol/L Chloride 106 100 - 108 mmol/L  
 CO2 26 21 - 32 mmol/L Anion gap 7 3.0 - 18 mmol/L Glucose 88 74 - 99 mg/dL BUN 13 7.0 - 18 MG/DL Creatinine 0.87 0.6 - 1.3 MG/DL  
 BUN/Creatinine ratio 15 12 - 20 GFR est AA >60 >60 ml/min/1.73m2 GFR est non-AA >60 >60 ml/min/1.73m2 Calcium 8.7 8.5 - 10.1 MG/DL Bilirubin, total 0.3 0.2 - 1.0 MG/DL  
 ALT (SGPT) 14 13 - 56 U/L  
 AST (SGOT) 13 (L) 15 - 37 U/L Alk. phosphatase 84 45 - 117 U/L Protein, total 7.2 6.4 - 8.2 g/dL Albumin 3.7 3.4 - 5.0 g/dL Globulin 3.5 2.0 - 4.0 g/dL A-G Ratio 1.1 0.8 - 1.7    
CBC WITH AUTOMATED DIFF Result Value Ref Range WBC 6.1 4.6 - 13.2 K/uL  
 RBC 4.25 4.20 - 5.30 M/uL  
 HGB 12.2 12.0 - 16.0 g/dL HCT 39.1 35.0 - 45.0 % MCV 92.0 74.0 - 97.0 FL  
 MCH 28.7 24.0 - 34.0 PG  
 MCHC 31.2 31.0 - 37.0 g/dL  
 RDW 14.9 (H) 11.6 - 14.5 % PLATELET 245 173 - 972 K/uL MPV 12.3 (H) 9.2 - 11.8 FL  
 NEUTROPHILS 73 40 - 73 % LYMPHOCYTES 19 (L) 21 - 52 % MONOCYTES 7 3 - 10 % EOSINOPHILS 1 0 - 5 % BASOPHILS 0 0 - 2 %  
 ABS. NEUTROPHILS 4.4 1.8 - 8.0 K/UL  
 ABS. LYMPHOCYTES 1.1 0.9 - 3.6 K/UL  
 ABS. MONOCYTES 0.4 0.05 - 1.2 K/UL  
 ABS. EOSINOPHILS 0.0 0.0 - 0.4 K/UL  
 ABS. BASOPHILS 0.0 0.0 - 0.1 K/UL  
 DF AUTOMATED    
LIPID PANEL Result Value Ref Range LIPID PROFILE Cholesterol, total 193 <200 MG/DL Triglyceride 68 <150 MG/DL  
 HDL Cholesterol 54 40 - 60 MG/DL  
 LDL, calculated 125.4 (H) 0 - 100 MG/DL VLDL, calculated 13.6 MG/DL  
 CHOL/HDL Ratio 3.6 0 - 5.0    
VITAMIN B12 Result Value Ref Range Vitamin B12 282 211 - 911 pg/mL TSH W/ REFLX FREE T4 IF ABNORMAL Result Value Ref Range TSH 1.51 0.36 - 3.74 uIU/mL Hospital Outpatient Visit on 04/22/2019 Component Date Value Ref Range Status  Sodium 04/22/2019 139  136 - 145 mmol/L Final  
 Potassium 04/22/2019 4.1  3.5 - 5.5 mmol/L Final  
 Chloride 04/22/2019 106  100 - 108 mmol/L Final  
 CO2 04/22/2019 26  21 - 32 mmol/L Final  
 Anion gap 04/22/2019 7  3.0 - 18 mmol/L Final  
 Glucose 04/22/2019 88  74 - 99 mg/dL Final  
 BUN 04/22/2019 13  7.0 - 18 MG/DL Final  
 Creatinine 04/22/2019 0.87  0.6 - 1.3 MG/DL Final  
 BUN/Creatinine ratio 04/22/2019 15  12 - 20   Final  
 GFR est AA 04/22/2019 >60  >60 ml/min/1.73m2 Final  
 GFR est non-AA 04/22/2019 >60  >60 ml/min/1.73m2 Final  
 Comment: (NOTE) Estimated GFR is calculated using the Modification of Diet in Renal  
Disease (MDRD) Study equation, reported for both  Americans [facility]) and non- Americans (GFRNA), and normalized to 1.73m2  
body surface area. The physician must decide which value applies to  
the patient. The MDRD study equation should only be used in  
individuals age 25 or older. It has not been validated for the  
following: pregnant women, patients with serious comorbid conditions,  
or on certain medications, or persons with extremes of body size,  
muscle mass, or nutritional status.  Calcium 04/22/2019 8.7  8.5 - 10.1 MG/DL Final  
 Bilirubin, total 04/22/2019 0.3  0.2 - 1.0 MG/DL Final  
 ALT (SGPT) 04/22/2019 14  13 - 56 U/L Final  
 AST (SGOT) 04/22/2019 13* 15 - 37 U/L Final  
 Alk.  phosphatase 04/22/2019 84  45 - 117 U/L Final  
 Protein, total 04/22/2019 7.2  6.4 - 8.2 g/dL Final  
 Albumin 04/22/2019 3.7  3.4 - 5.0 g/dL Final  
 Globulin 04/22/2019 3.5  2.0 - 4.0 g/dL Final  
 A-G Ratio 04/22/2019 1.1  0.8 - 1.7   Final  
 WBC 04/22/2019 6.1  4.6 - 13.2 K/uL Final  
 RBC 04/22/2019 4.25  4.20 - 5.30 M/uL Final  
 HGB 04/22/2019 12.2  12.0 - 16.0 g/dL Final  
 HCT 04/22/2019 39.1  35.0 - 45.0 % Final  
  MCV 04/22/2019 92.0  74.0 - 97.0 FL Final  
 MCH 04/22/2019 28.7  24.0 - 34.0 PG Final  
 MCHC 04/22/2019 31.2  31.0 - 37.0 g/dL Final  
 RDW 04/22/2019 14.9* 11.6 - 14.5 % Final  
 PLATELET 28/15/3565 279  135 - 420 K/uL Final  
 MPV 04/22/2019 12.3* 9.2 - 11.8 FL Final  
 NEUTROPHILS 04/22/2019 73  40 - 73 % Final  
 LYMPHOCYTES 04/22/2019 19* 21 - 52 % Final  
 MONOCYTES 04/22/2019 7  3 - 10 % Final  
 EOSINOPHILS 04/22/2019 1  0 - 5 % Final  
 BASOPHILS 04/22/2019 0  0 - 2 % Final  
 ABS. NEUTROPHILS 04/22/2019 4.4  1.8 - 8.0 K/UL Final  
 ABS. LYMPHOCYTES 04/22/2019 1.1  0.9 - 3.6 K/UL Final  
 ABS. MONOCYTES 04/22/2019 0.4  0.05 - 1.2 K/UL Final  
 ABS. EOSINOPHILS 04/22/2019 0.0  0.0 - 0.4 K/UL Final  
 ABS. BASOPHILS 04/22/2019 0.0  0.0 - 0.1 K/UL Final  
 DF 04/22/2019 AUTOMATED    Final  
 LIPID PROFILE 04/22/2019        Final  
 Cholesterol, total 04/22/2019 193  <200 MG/DL Final  
 Triglyceride 04/22/2019 68  <150 MG/DL Final  
 Comment: The drugs N-acetylcysteine (NAC) and 
Metamiszole have been found to cause falsely 
low results in this chemical assay. Please 
be sure to submit blood samples obtained BEFORE administration of either of these 
drugs to assure correct results.  HDL Cholesterol 04/22/2019 54  40 - 60 MG/DL Final  
 LDL, calculated 04/22/2019 125.4* 0 - 100 MG/DL Final  
 VLDL, calculated 04/22/2019 13.6  MG/DL Final  
 CHOL/HDL Ratio 04/22/2019 3.6  0 - 5.0   Final  
 Vitamin B12 04/22/2019 282  211 - 911 pg/mL Final  
 TSH 04/22/2019 1.51  0.36 - 3.74 uIU/mL Final  
  
 
 
Follow-up and Dispositions · Return in about 3 months (around 9/17/2019).

## 2019-06-17 NOTE — PROGRESS NOTES
Kana Larson is a 80 y.o. female presents to office for annual wellness Medication list has been reviewed with Kana Larson and updated as of today's date Health Maintenance items with a due date reviewed with patient: 
Health Maintenance Due Topic Date Due  
 DTaP/Tdap/Td series (1 - Tdap) 11/13/1942  Shingrix Vaccine Age 50> (1 of 2) 11/13/1971  GLAUCOMA SCREENING Q2Y  11/13/1986  Bone Densitometry (Dexa) Screening  11/13/1986  Pneumococcal 65+ years (1 of 2 - PCV13) 11/13/1986  MEDICARE YEARLY EXAM  04/22/2019

## 2019-06-25 ENCOUNTER — TELEPHONE (OUTPATIENT)
Dept: FAMILY MEDICINE CLINIC | Age: 84
End: 2019-06-25

## 2019-06-25 NOTE — TELEPHONE ENCOUNTER
Pt's caregiver, Eduardo Castelan, is requesting a letter requesting she be excused fron jury duty.  She can be reached at [phone]

## 2019-06-27 ENCOUNTER — TELEPHONE (OUTPATIENT)
Dept: FAMILY MEDICINE CLINIC | Age: 84
End: 2019-06-27

## 2019-06-27 NOTE — TELEPHONE ENCOUNTER
Caregiver stated pt's BP is 193/82 & was told by another doc that her losartan should be be changed to 100 mg from 50 mg. She would like to verify this with her PCP.

## 2019-06-28 ENCOUNTER — TELEPHONE (OUTPATIENT)
Dept: FAMILY MEDICINE CLINIC | Age: 84
End: 2019-06-28

## 2019-06-28 NOTE — TELEPHONE ENCOUNTER
Please have the patient come to the office for nurse visit for blood pressure check to confirm elevated blood pressure

## 2019-07-01 ENCOUNTER — CLINICAL SUPPORT (OUTPATIENT)
Dept: FAMILY MEDICINE CLINIC | Age: 84
End: 2019-07-01

## 2019-07-01 DIAGNOSIS — Z01.30 BP CHECK: Primary | ICD-10-CM

## 2019-07-01 NOTE — PROGRESS NOTES
Austin Carson is a 80 y.o. female present in office for bp check 180/88. PCP increased losartan to 75mg. 1 1/2 TAB of 50 mg.  Patient to follow up in 3 weeks

## 2019-07-23 ENCOUNTER — OFFICE VISIT (OUTPATIENT)
Dept: FAMILY MEDICINE CLINIC | Age: 84
End: 2019-07-23

## 2019-07-23 VITALS
RESPIRATION RATE: 18 BRPM | SYSTOLIC BLOOD PRESSURE: 187 MMHG | OXYGEN SATURATION: 100 % | WEIGHT: 139 LBS | HEIGHT: 59 IN | DIASTOLIC BLOOD PRESSURE: 72 MMHG | BODY MASS INDEX: 28.02 KG/M2 | HEART RATE: 65 BPM

## 2019-07-23 DIAGNOSIS — I10 SYSTOLIC HYPERTENSION: Primary | ICD-10-CM

## 2019-07-23 DIAGNOSIS — I10 ESSENTIAL HYPERTENSION: ICD-10-CM

## 2019-07-23 DIAGNOSIS — Z11.1 ENCOUNTER FOR PPD TEST: ICD-10-CM

## 2019-07-23 DIAGNOSIS — Z78.0 MENOPAUSE: ICD-10-CM

## 2019-07-23 RX ORDER — DONEPEZIL HYDROCHLORIDE 5 MG/1
TABLET, FILM COATED ORAL
Refills: 2 | COMMUNITY
Start: 2019-06-28 | End: 2019-09-16

## 2019-07-23 RX ORDER — LOSARTAN POTASSIUM 50 MG/1
50 TABLET ORAL DAILY
Qty: 90 TAB | Refills: 1 | Status: SHIPPED | OUTPATIENT
Start: 2019-07-23 | End: 2019-11-17 | Stop reason: DRUGHIGH

## 2019-07-23 RX ORDER — LOSARTAN POTASSIUM 25 MG/1
25 TABLET ORAL DAILY
Qty: 90 TAB | Refills: 1 | Status: SHIPPED | OUTPATIENT
Start: 2019-07-23 | End: 2019-11-17 | Stop reason: DRUGHIGH

## 2019-07-23 NOTE — PROGRESS NOTES
Yoshi Malloy     Chief Complaint   Patient presents with    Hypertension     Follow Up     Vitals:    07/23/19 1432   BP: 187/72   Pulse: 65   Resp: 18   SpO2: 100%   Weight: 139 lb (63 kg)   Height: 4' 11\" (1.499 m)   PainSc:   0 - No pain         HPI: Patient is here accompanied by her niece for follow-up on hypertension, also she need a form to be completed for adult . Blood pressures still slightly elevated especially diastolic she is on 75 mg of losartan daily. No chest pain no shortness of breath no dizziness. Seen eye doctor 1/2019  Advised to  Sign to release  report . Past Medical History:   Diagnosis Date    Arthritis     Hypertension      Past Surgical History:   Procedure Laterality Date    HX HIP REPLACEMENT       Social History     Tobacco Use    Smoking status: Never Smoker    Smokeless tobacco: Never Used   Substance Use Topics    Alcohol use: Not Currently       Family History   Problem Relation Age of Onset    No Known Problems Mother     No Known Problems Father        Review of Systems   Constitutional: Negative for chills, fever, malaise/fatigue and weight loss. HENT: Negative for congestion, ear discharge, ear pain, hearing loss and nosebleeds. Eyes: Negative for blurred vision, double vision and discharge. Respiratory: Negative for cough. Cardiovascular: Negative for chest pain, palpitations, claudication and leg swelling. Gastrointestinal: Negative for abdominal pain, blood in stool, constipation, diarrhea, nausea and vomiting. Genitourinary: Negative for dysuria, frequency and urgency. Musculoskeletal: Negative for myalgias. Skin: Negative for itching and rash. Neurological: Negative for dizziness, tingling, sensory change, speech change, focal weakness, weakness and headaches. Psychiatric/Behavioral: Negative for depression and suicidal ideas. Physical Exam   Constitutional: She is oriented to person, place, and time.  She appears well-developed and well-nourished. No distress. HENT:   Head: Normocephalic and atraumatic. Mouth/Throat: Oropharynx is clear and moist. No oropharyngeal exudate. Eyes: Pupils are equal, round, and reactive to light. Conjunctivae are normal. Right eye exhibits no discharge. Left eye exhibits no discharge. No scleral icterus. Neck: Normal range of motion. Neck supple. No thyromegaly present. Cardiovascular: Normal rate, regular rhythm and normal heart sounds. No murmur heard. Pulmonary/Chest: Effort normal and breath sounds normal. No respiratory distress. She has no wheezes. She has no rales. She exhibits no tenderness. Abdominal: Soft. Bowel sounds are normal. She exhibits no distension. There is no tenderness. There is no rebound. Musculoskeletal: Normal range of motion. She exhibits no edema, tenderness or deformity. Lymphadenopathy:     She has no cervical adenopathy. Neurological: She is alert and oriented to person, place, and time. No cranial nerve deficit. Coordination normal.   Skin: Skin is warm and dry. No rash noted. She is not diaphoretic. No erythema. No pallor. Psychiatric: She has a normal mood and affect. Her behavior is normal. Judgment and thought content normal.   Nursing note and vitals reviewed. Assessment and plan     Plan of care has been discussed with the patient, he agrees to the plan and verbalized understanding. All his questions were answered  More than 50% of the time spent in this visit was counseling the patient about  illness and treatment options         1. Menopause    - DEXA BONE DENSITY STUDY AXIAL; Future    2. Systolic hypertension    - AMB SUPPLY ORDER      - losartan (COZAAR) 50 mg tablet; Take 1 Tab by mouth daily. Take 25 mg and 50 total of 75 daily  Dispense: 90 Tab; Refill: 1  - losartan (COZAAR) 25 mg tablet; Take 1 Tab by mouth daily. Take 25 and 50 mg total 75 mg daily  Dispense: 90 Tab; Refill: 1    4.  Encounter for PPD test    - AMB POC TUBERCULOSIS, INTRADERMAL (SKIN TEST)    Current Outpatient Medications   Medication Sig Dispense Refill    donepezil (ARICEPT) 5 mg tablet   2    losartan (COZAAR) 50 mg tablet Take 1 Tab by mouth daily. Take 25 mg and 50 total of 75 daily 90 Tab 1    losartan (COZAAR) 25 mg tablet Take 1 Tab by mouth daily. Take 25 and 50 mg total 75 mg daily 90 Tab 1       Patient Active Problem List    Diagnosis Date Noted    Left hip pain 05/20/2019    History of left hip replacement 59/39/3886    Systolic hypertension 23/85/9728    Mixed hyperlipidemia 05/11/2019    Dementia without behavioral disturbance 05/11/2019     Results for orders placed or performed during the hospital encounter of 60/47/35   METABOLIC PANEL, COMPREHENSIVE   Result Value Ref Range    Sodium 139 136 - 145 mmol/L    Potassium 4.1 3.5 - 5.5 mmol/L    Chloride 106 100 - 108 mmol/L    CO2 26 21 - 32 mmol/L    Anion gap 7 3.0 - 18 mmol/L    Glucose 88 74 - 99 mg/dL    BUN 13 7.0 - 18 MG/DL    Creatinine 0.87 0.6 - 1.3 MG/DL    BUN/Creatinine ratio 15 12 - 20      GFR est AA >60 >60 ml/min/1.73m2    GFR est non-AA >60 >60 ml/min/1.73m2    Calcium 8.7 8.5 - 10.1 MG/DL    Bilirubin, total 0.3 0.2 - 1.0 MG/DL    ALT (SGPT) 14 13 - 56 U/L    AST (SGOT) 13 (L) 15 - 37 U/L    Alk. phosphatase 84 45 - 117 U/L    Protein, total 7.2 6.4 - 8.2 g/dL    Albumin 3.7 3.4 - 5.0 g/dL    Globulin 3.5 2.0 - 4.0 g/dL    A-G Ratio 1.1 0.8 - 1.7     CBC WITH AUTOMATED DIFF   Result Value Ref Range    WBC 6.1 4.6 - 13.2 K/uL    RBC 4.25 4.20 - 5.30 M/uL    HGB 12.2 12.0 - 16.0 g/dL    HCT 39.1 35.0 - 45.0 %    MCV 92.0 74.0 - 97.0 FL    MCH 28.7 24.0 - 34.0 PG    MCHC 31.2 31.0 - 37.0 g/dL    RDW 14.9 (H) 11.6 - 14.5 %    PLATELET 678 697 - 779 K/uL    MPV 12.3 (H) 9.2 - 11.8 FL    NEUTROPHILS 73 40 - 73 %    LYMPHOCYTES 19 (L) 21 - 52 %    MONOCYTES 7 3 - 10 %    EOSINOPHILS 1 0 - 5 %    BASOPHILS 0 0 - 2 %    ABS. NEUTROPHILS 4.4 1.8 - 8.0 K/UL    ABS.  LYMPHOCYTES 1.1 0.9 - 3.6 K/UL    ABS. MONOCYTES 0.4 0.05 - 1.2 K/UL    ABS. EOSINOPHILS 0.0 0.0 - 0.4 K/UL    ABS. BASOPHILS 0.0 0.0 - 0.1 K/UL    DF AUTOMATED     LIPID PANEL   Result Value Ref Range    LIPID PROFILE          Cholesterol, total 193 <200 MG/DL    Triglyceride 68 <150 MG/DL    HDL Cholesterol 54 40 - 60 MG/DL    LDL, calculated 125.4 (H) 0 - 100 MG/DL    VLDL, calculated 13.6 MG/DL    CHOL/HDL Ratio 3.6 0 - 5.0     VITAMIN B12   Result Value Ref Range    Vitamin B12 282 211 - 911 pg/mL   TSH W/ REFLX FREE T4 IF ABNORMAL   Result Value Ref Range    TSH 1.51 0.36 - 3.74 uIU/mL     No visits with results within 3 Month(s) from this visit. Latest known visit with results is:   Hospital Outpatient Visit on 04/22/2019   Component Date Value Ref Range Status    Sodium 04/22/2019 139  136 - 145 mmol/L Final    Potassium 04/22/2019 4.1  3.5 - 5.5 mmol/L Final    Chloride 04/22/2019 106  100 - 108 mmol/L Final    CO2 04/22/2019 26  21 - 32 mmol/L Final    Anion gap 04/22/2019 7  3.0 - 18 mmol/L Final    Glucose 04/22/2019 88  74 - 99 mg/dL Final    BUN 04/22/2019 13  7.0 - 18 MG/DL Final    Creatinine 04/22/2019 0.87  0.6 - 1.3 MG/DL Final    BUN/Creatinine ratio 04/22/2019 15  12 - 20   Final    GFR est AA 04/22/2019 >60  >60 ml/min/1.73m2 Final    GFR est non-AA 04/22/2019 >60  >60 ml/min/1.73m2 Final    Comment: (NOTE)  Estimated GFR is calculated using the Modification of Diet in Renal   Disease (MDRD) Study equation, reported for both  Americans   (GFRAA) and non- Americans (GFRNA), and normalized to 1.73m2   body surface area. The physician must decide which value applies to   the patient. The MDRD study equation should only be used in   individuals age 25 or older. It has not been validated for the   following: pregnant women, patients with serious comorbid conditions,   or on certain medications, or persons with extremes of body size,   muscle mass, or nutritional status.       Calcium 04/22/2019 8.7  8.5 - 10.1 MG/DL Final    Bilirubin, total 04/22/2019 0.3  0.2 - 1.0 MG/DL Final    ALT (SGPT) 04/22/2019 14  13 - 56 U/L Final    AST (SGOT) 04/22/2019 13* 15 - 37 U/L Final    Alk. phosphatase 04/22/2019 84  45 - 117 U/L Final    Protein, total 04/22/2019 7.2  6.4 - 8.2 g/dL Final    Albumin 04/22/2019 3.7  3.4 - 5.0 g/dL Final    Globulin 04/22/2019 3.5  2.0 - 4.0 g/dL Final    A-G Ratio 04/22/2019 1.1  0.8 - 1.7   Final    WBC 04/22/2019 6.1  4.6 - 13.2 K/uL Final    RBC 04/22/2019 4.25  4.20 - 5.30 M/uL Final    HGB 04/22/2019 12.2  12.0 - 16.0 g/dL Final    HCT 04/22/2019 39.1  35.0 - 45.0 % Final    MCV 04/22/2019 92.0  74.0 - 97.0 FL Final    MCH 04/22/2019 28.7  24.0 - 34.0 PG Final    MCHC 04/22/2019 31.2  31.0 - 37.0 g/dL Final    RDW 04/22/2019 14.9* 11.6 - 14.5 % Final    PLATELET 15/92/0896 137  135 - 420 K/uL Final    MPV 04/22/2019 12.3* 9.2 - 11.8 FL Final    NEUTROPHILS 04/22/2019 73  40 - 73 % Final    LYMPHOCYTES 04/22/2019 19* 21 - 52 % Final    MONOCYTES 04/22/2019 7  3 - 10 % Final    EOSINOPHILS 04/22/2019 1  0 - 5 % Final    BASOPHILS 04/22/2019 0  0 - 2 % Final    ABS. NEUTROPHILS 04/22/2019 4.4  1.8 - 8.0 K/UL Final    ABS. LYMPHOCYTES 04/22/2019 1.1  0.9 - 3.6 K/UL Final    ABS. MONOCYTES 04/22/2019 0.4  0.05 - 1.2 K/UL Final    ABS. EOSINOPHILS 04/22/2019 0.0  0.0 - 0.4 K/UL Final    ABS. BASOPHILS 04/22/2019 0.0  0.0 - 0.1 K/UL Final    DF 04/22/2019 AUTOMATED    Final    LIPID PROFILE 04/22/2019        Final    Cholesterol, total 04/22/2019 193  <200 MG/DL Final    Triglyceride 04/22/2019 68  <150 MG/DL Final    Comment: The drugs N-acetylcysteine (NAC) and  Metamiszole have been found to cause falsely  low results in this chemical assay. Please  be sure to submit blood samples obtained  BEFORE administration of either of these  drugs to assure correct results.       HDL Cholesterol 04/22/2019 54  40 - 60 MG/DL Final    LDL, calculated 04/22/2019 125.4* 0 - 100 MG/DL Final    VLDL, calculated 04/22/2019 13.6  MG/DL Final    CHOL/HDL Ratio 04/22/2019 3.6  0 - 5.0   Final    Vitamin B12 04/22/2019 282  211 - 911 pg/mL Final    TSH 04/22/2019 1.51  0.36 - 3.74 uIU/mL Final          Follow-up and Dispositions    · Return in about 2 months (around 9/23/2019) for medicare wellness .

## 2019-07-23 NOTE — PROGRESS NOTES
1. Have you been to the ER, urgent care clinic since your last visit? Hospitalized since your last visit? No    2. Have you seen or consulted any other health care providers outside of the [facility] since your last visit? Include any pap smears or colon screening.  No        Chief Complaint   Patient presents with    Hypertension     Follow Up

## 2019-07-26 ENCOUNTER — CLINICAL SUPPORT (OUTPATIENT)
Dept: FAMILY MEDICINE CLINIC | Age: 84
End: 2019-07-26

## 2019-07-26 DIAGNOSIS — Z11.1 ENCOUNTER FOR PPD SKIN TEST READING: Primary | ICD-10-CM

## 2019-07-26 LAB
MM INDURATION POC: 0 MM (ref 0–5)
PPD POC: NEGATIVE NEGATIVE

## 2019-07-26 NOTE — PROGRESS NOTES
PPD Reading Note  PPD read and results entered in [facility]. Result: 0 mm induration.   Interpretation: negative  If test not read within 48-72 hours of initial placement, patient advised to repeat in other arm 1-3 weeks after this test.  Allergic reaction: no

## 2019-08-12 ENCOUNTER — CLINICAL SUPPORT (OUTPATIENT)
Dept: FAMILY MEDICINE CLINIC | Age: 84
End: 2019-08-12

## 2019-08-12 VITALS
SYSTOLIC BLOOD PRESSURE: 186 MMHG | OXYGEN SATURATION: 98 % | HEART RATE: 69 BPM | DIASTOLIC BLOOD PRESSURE: 88 MMHG | RESPIRATION RATE: 15 BRPM | TEMPERATURE: 96.5 F

## 2019-08-12 DIAGNOSIS — Z01.30 BP CHECK: Primary | ICD-10-CM

## 2019-08-12 NOTE — PROGRESS NOTES
Visit Vitals  /88 (BP 1 Location: Left arm, BP Patient Position: Sitting)   Pulse 69   Temp 96.5 °F (35.8 °C) (Temporal)   Resp 15   SpO2 98%     BP was re-checked after 10 minutes abnd it has gotten better  /78  Pt denies any chesr pain, numbness and no any other discomfort. Advised pt to go to the ER per Dr Jose Grant.

## 2019-09-16 ENCOUNTER — OFFICE VISIT (OUTPATIENT)
Dept: FAMILY MEDICINE CLINIC | Age: 84
End: 2019-09-16

## 2019-09-16 ENCOUNTER — HOSPITAL ENCOUNTER (OUTPATIENT)
Dept: LAB | Age: 84
Discharge: HOME | End: 2019-09-16
Payer: MEDICARE

## 2019-09-16 VITALS
BODY MASS INDEX: 28.35 KG/M2 | TEMPERATURE: 97.9 F | WEIGHT: 140.6 LBS | HEART RATE: 83 BPM | HEIGHT: 59 IN | OXYGEN SATURATION: 98 % | SYSTOLIC BLOOD PRESSURE: 144 MMHG | DIASTOLIC BLOOD PRESSURE: 83 MMHG | RESPIRATION RATE: 18 BRPM

## 2019-09-16 DIAGNOSIS — L98.9 SKIN LESION: ICD-10-CM

## 2019-09-16 DIAGNOSIS — E78.2 MIXED HYPERLIPIDEMIA: ICD-10-CM

## 2019-09-16 DIAGNOSIS — F01.50 VASCULAR DEMENTIA WITHOUT BEHAVIORAL DISTURBANCE (HCC): ICD-10-CM

## 2019-09-16 DIAGNOSIS — I10 ESSENTIAL HYPERTENSION: ICD-10-CM

## 2019-09-16 DIAGNOSIS — Z00.00 MEDICARE ANNUAL WELLNESS VISIT, SUBSEQUENT: ICD-10-CM

## 2019-09-16 DIAGNOSIS — Z23 ENCOUNTER FOR IMMUNIZATION: Primary | ICD-10-CM

## 2019-09-16 LAB
ALBUMIN SERPL-MCNC: 3.6 G/DL (ref 3.4–5)
ALBUMIN/GLOB SERPL: 1 {RATIO} (ref 0.8–1.7)
ALP SERPL-CCNC: 76 U/L (ref 45–117)
ALT SERPL-CCNC: 15 U/L (ref 13–56)
ANION GAP SERPL CALC-SCNC: 10 MMOL/L (ref 3–18)
AST SERPL-CCNC: 15 U/L (ref 10–38)
BASOPHILS # BLD: 0 K/UL (ref 0–0.1)
BASOPHILS NFR BLD: 0 % (ref 0–2)
BILIRUB SERPL-MCNC: 0.4 MG/DL (ref 0.2–1)
BUN SERPL-MCNC: 16 MG/DL (ref 7–18)
BUN/CREAT SERPL: 16 (ref 12–20)
CALCIUM SERPL-MCNC: 9 MG/DL (ref 8.5–10.1)
CHLORIDE SERPL-SCNC: 104 MMOL/L (ref 100–111)
CO2 SERPL-SCNC: 24 MMOL/L (ref 21–32)
CREAT SERPL-MCNC: 0.98 MG/DL (ref 0.6–1.3)
DIFFERENTIAL METHOD BLD: ABNORMAL
EOSINOPHIL # BLD: 0 K/UL (ref 0–0.4)
EOSINOPHIL NFR BLD: 1 % (ref 0–5)
ERYTHROCYTE [DISTWIDTH] IN BLOOD BY AUTOMATED COUNT: 14.9 % (ref 11.6–14.5)
GLOBULIN SER CALC-MCNC: 3.6 G/DL (ref 2–4)
GLUCOSE SERPL-MCNC: 100 MG/DL (ref 74–99)
HCT VFR BLD AUTO: 36.8 % (ref 35–45)
HGB BLD-MCNC: 12 G/DL (ref 12–16)
LYMPHOCYTES # BLD: 1.1 K/UL (ref 0.9–3.6)
LYMPHOCYTES NFR BLD: 20 % (ref 21–52)
MCH RBC QN AUTO: 29.1 PG (ref 24–34)
MCHC RBC AUTO-ENTMCNC: 32.6 G/DL (ref 31–37)
MCV RBC AUTO: 89.3 FL (ref 74–97)
MONOCYTES # BLD: 0.4 K/UL (ref 0.05–1.2)
MONOCYTES NFR BLD: 7 % (ref 3–10)
NEUTS SEG # BLD: 4 K/UL (ref 1.8–8)
NEUTS SEG NFR BLD: 72 % (ref 40–73)
PLATELET # BLD AUTO: 299 K/UL (ref 135–420)
PMV BLD AUTO: 11.4 FL (ref 9.2–11.8)
POTASSIUM SERPL-SCNC: 3.7 MMOL/L (ref 3.5–5.5)
PROT SERPL-MCNC: 7.2 G/DL (ref 6.4–8.2)
RBC # BLD AUTO: 4.12 M/UL (ref 4.2–5.3)
SODIUM SERPL-SCNC: 138 MMOL/L (ref 136–145)
WBC # BLD AUTO: 5.5 K/UL (ref 4.6–13.2)

## 2019-09-16 PROCEDURE — 80053 COMPREHEN METABOLIC PANEL: CPT

## 2019-09-16 PROCEDURE — 85025 COMPLETE CBC W/AUTO DIFF WBC: CPT

## 2019-09-16 PROCEDURE — 36415 COLL VENOUS BLD VENIPUNCTURE: CPT

## 2019-09-16 RX ORDER — AMOXICILLIN 500 MG/1
CAPSULE ORAL
Refills: 0 | COMMUNITY
Start: 2019-09-01 | End: 2019-09-16

## 2019-09-16 NOTE — PROGRESS NOTES
(AWV) The Initial Medicare Annual Wellness Exam PROGRESS NOTE    This is an Initial Medicare Annual Wellness Exam (AWV) (Performed 12 months after IPPE or effective date of Medicare Part B enrollment, Once in a lifetime)    I have reviewed the patient's medical history in detail and updated the computerized patient record. Maria Esther Lockett is a 80 y.o.  female and presents for an annual wellness exam   And also to follow-up on hypertension    Blood pressure reading has much improved patient is taking currently 75 mg of losartan    Patient Active Problem List   Diagnosis Code    Mixed hyperlipidemia E78.2    Dementia without behavioral disturbance F03.90    Left hip pain M25.552    History of left hip replacement U05.630    Systolic hypertension D40    Essential hypertension I10     Patient Active Problem List    Diagnosis Date Noted    Essential hypertension 09/16/2019    Left hip pain 05/20/2019    History of left hip replacement 65/93/1913    Systolic hypertension 17/63/3628    Mixed hyperlipidemia 05/11/2019    Dementia without behavioral disturbance 05/11/2019     Current Outpatient Medications   Medication Sig Dispense Refill    losartan (COZAAR) 50 mg tablet Take 1 Tab by mouth daily. Take 25 mg and 50 total of 75 daily 90 Tab 1    losartan (COZAAR) 25 mg tablet Take 1 Tab by mouth daily.  Take 25 and 50 mg total 75 mg daily 90 Tab 1     No Known Allergies  Past Medical History:   Diagnosis Date    Arthritis     Hypertension      Past Surgical History:   Procedure Laterality Date    HX HIP REPLACEMENT       Family History   Problem Relation Age of Onset    No Known Problems Mother     No Known Problems Father      Social History     Tobacco Use    Smoking status: Never Smoker    Smokeless tobacco: Never Used   Substance Use Topics    Alcohol use: Not Currently       ROS   Negative except Recent dental infection and treated with antibiotics  General ROS: negative for - chills, fatigue, fever or malaise  Psychological ROS: negative for - anxiety, depression or mood swings  Respiratory ROS: no cough, shortness of breath, or wheezing  Cardiovascular ROS: no chest pain or dyspnea on exertion  Gastrointestinal ROS: no abdominal pain, change in bowel habits, or black or bloody stools  Genito-Urinary ROS: no dysuria, trouble voiding, or hematuria  Musculoskeletal ROS: positive for - joint pain and joint stiffness  Neurological ROS: no TIA or stroke symptoms  Dermatological ROS: positive  - skin lesion changes patient has a skin lesion in the left side of scalp   Has foot wart and callus in the left foot     All other systems reviewed and are negative. History     Past Medical History:   Diagnosis Date    Arthritis     Hypertension       Past Surgical History:   Procedure Laterality Date    HX HIP REPLACEMENT       Current Outpatient Medications   Medication Sig Dispense Refill    losartan (COZAAR) 50 mg tablet Take 1 Tab by mouth daily. Take 25 mg and 50 total of 75 daily 90 Tab 1    losartan (COZAAR) 25 mg tablet Take 1 Tab by mouth daily.  Take 25 and 50 mg total 75 mg daily 90 Tab 1     No Known Allergies  Family History   Problem Relation Age of Onset    No Known Problems Mother     No Known Problems Father      Social History     Tobacco Use    Smoking status: Never Smoker    Smokeless tobacco: Never Used   Substance Use Topics    Alcohol use: Not Currently     Patient Active Problem List   Diagnosis Code    Mixed hyperlipidemia E78.2    Dementia without behavioral disturbance F03.90    Left hip pain M25.552    History of left hip replacement A50.747    Systolic hypertension I05    Essential hypertension I10       Health Maintenance History  Patient is knees does not have any of her old records I advised her to go to Medicare to find out who was her primary care doctor, she received flu vaccine today, and if records were to be found in and obtained will be reviewed for any further need for vaccination otherwise she will be given pneumonia vaccine if we cannot find the records    Depression Risk Factor Screening:      Patient Health Questionnaire (PHQ-2)   Over the last 2 weeks, how often have you been bothered by any of the following problems? · Little interest or pleasure in doing things? · Not at all. [0]  · Feeling down, depressed, or hopeless? · Not at all. [0]    Total Score: 0/6  PHQ-2 Assessment Scoring:   A score of 2 or more requires further screening with the PHQ-9    Alcohol Risk Factor Screening:     Women: On any occasion during the past 3 months, have you had more than 3 drinks containing alcohol? No   Do you average more than 7 drinks per week? No    Men: On any occasion during the past 3 months, have you had more than 4 drinks containing alcohol? Do you average more than 14 drinks per week? Functional Ability and Level of Safety:     Hearing Loss    The patient wears hearing aids. Activities of Daily Living   Partial assistance. Requires assistance with: feeding and no ADLs    Fall Risk   Ambulatory aid device (15 pts)    Abuse Screen   Patient is not abused  None    Examination   Physical Examination  Vitals:    09/16/19 0937   BP: 144/83   Pulse: 83   Resp: 18   Temp: 97.9 °F (36.6 °C)   TempSrc: Oral   SpO2: 98%   Weight: 140 lb 9.6 oz (63.8 kg)   Height: 4' 11\" (1.499 m)   PainSc:   0 - No pain      Body mass index is 28.4 kg/m².      Evaluation of Cognitive Function:  Mood/affect:Good   Appearance:well groomed   Family member/caregiver input: she stared   To go to the [facility] day care and she is doing well  No worsening in her memory   alert, well appearing, and in no distress    Patient Care Team:  Cayetano Velasquez MD as PCP - General (Internal Medicine)    End-of-life planning  Advanced Directive in the case than an injury or illness causes the patient to be unable to make health care decisions    Health Care Directive or Living Will: yes    Advice/Referrals/Counselling/Plan:   Education and counseling provided:  Are appropriate based on today's review and evaluation  Pneumococcal Vaccine  Influenza Vaccine  Screening for glaucoma  Include in education list (weight loss, physical activity, smoking cessation, fall prevention, and nutrition)    ICD-10-CM ICD-9-CM    1. Encounter for immunization Z23 V03.89 INFLUENZA VACCINE INACTIVATED (IIV), SUBUNIT, ADJUVANTED, IM      ADMIN INFLUENZA VIRUS VAC   2. Essential hypertension I10 401.9 CBC WITH AUTOMATED DIFF      METABOLIC PANEL, COMPREHENSIVE   3. Mixed hyperlipidemia E78.2 272.2 CBC WITH AUTOMATED DIFF      METABOLIC PANEL, COMPREHENSIVE   4. Vascular dementia without behavioral disturbance F01.50 290.40    5. Skin lesion L98.9 709.9 REFERRAL TO DERMATOLOGY   6. Medicare annual wellness visit, subsequent Z00.00 V70.0      Encounter Diagnoses   Name Primary?     Encounter for immunization Yes    Essential hypertension     Mixed hyperlipidemia     Vascular dementia without behavioral disturbance     Skin lesion     Medicare annual wellness visit, subsequent      Orders Placed This Encounter    Influenza Vaccine Inactivated (IIV)(FLUAD), Subunit, Adjuvanted, IM, (42178)    CBC WITH AUTOMATED DIFF    METABOLIC PANEL, COMPREHENSIVE    REFERRAL TO DERMATOLOGY    DISCONTD: amoxicillin (AMOXIL) 500 mg capsule     Orders Placed This Encounter    Influenza Vaccine Inactivated (IIV)(FLUAD), Subunit, Adjuvanted, IM, (07279)    CBC WITH AUTOMATED DIFF     Standing Status:   Future     Number of Occurrences:   1     Standing Expiration Date:   7/64/4216    METABOLIC PANEL, COMPREHENSIVE     Standing Status:   Future     Number of Occurrences:   1     Standing Expiration Date:   9/16/2020    REFERRAL TO DERMATOLOGY     Referral Priority:   Routine     Referral Type:   Consultation     Referral Reason:   Specialty Services Required     Requested Specialty:   Dermatology     Number of Visits Requested:   1    Administration fee () for Medicare insured patients     Orders Placed This Encounter    Influenza Vaccine Inactivated (IIV)(FLUAD), Subunit, Adjuvanted, IM, (87583)    CBC WITH AUTOMATED DIFF    METABOLIC PANEL, COMPREHENSIVE    REFERRAL TO DERMATOLOGY    Administration fee () for Medicare insured patients     Diagnoses and all orders for this visit:    1. Encounter for immunization  -     INFLUENZA VACCINE INACTIVATED (IIV), SUBUNIT, ADJUVANTED, IM  -     ADMIN INFLUENZA VIRUS VAC    2. Essential hypertension  -     CBC WITH AUTOMATED DIFF; Future  -     METABOLIC PANEL, COMPREHENSIVE; Future    3. Mixed hyperlipidemia  -     CBC WITH AUTOMATED DIFF; Future  -     METABOLIC PANEL, COMPREHENSIVE; Future    4. Vascular dementia without behavioral disturbance    5. Skin lesion  -     REFERRAL TO DERMATOLOGY    6. Medicare annual wellness visit, subsequent      Follow-up and Dispositions    · Return in about 3 months (around 12/16/2019) for 2 weeks for nurse visit for BP check . current treatment plan is effective, no change in therapy  lab results and schedule of future lab studies reviewed with patient  reviewed medications and side effects in detail. Brief written plan, checklist    I have discussed the diagnosis with the patient and the intended plan as seen in the above orders. The patient has received an after-visit summary and questions were answered concerning future plans. I have discussed medication side effects and warnings with the patient as well. I have reviewed the plan of care with the patient, accepted their input and they are in agreement with the treatment goals. Caregiver stating blood pressure sometimes get elevated follow-up in 2 weeks for nurse visit  Follow-up and Dispositions    · Return in about 3 months (around 12/16/2019) for 2 weeks for nurse visit for BP check . ____________________________________________________________    Problem Assessment    for treatment of   Chief Complaint   Patient presents with    Hypertension     f/u    Annual Wellness Visit           Visit Vitals  /83 (BP 1 Location: Left arm, BP Patient Position: Sitting)   Pulse 83   Temp 97.9 °F (36.6 °C) (Oral)   Resp 18   Ht 4' 11\" (1.499 m)   Wt 140 lb 9.6 oz (63.8 kg)   SpO2 98%   BMI 28.40 kg/m²     General appearance: alert, cooperative, no distress, appears stated age  Head: Normocephalic, without obvious abnormality, atraumatic  Neck: supple, symmetrical, trachea midline, no adenopathy and thyroid: not enlarged, symmetric, no tenderness/mass/nodules  Lungs: clear to auscultation bilaterally  Heart: S1, S2 normal  Abdomen: soft, non-tender.    Extremities: extremities normal, atraumatic, no cyanosis or edema, no edema    Skin there is hyperpigmented lesion on the left scalp non tender slightly raised     Lab review: orders written for new lab studies as appropriate; see orders

## 2019-09-16 NOTE — PROGRESS NOTES
Ed Etienne is a 80 y.o. female presents in office for hypertension and MWV. Health Maintenance Due   Topic Date Due    DTaP/Tdap/Td series (1 - Tdap) 11[birth year]/1942    Shingrix Vaccine Age 50> (1 of 2) 11/13/1971    GLAUCOMA SCREENING Q2Y  11/13/1986    Pneumococcal 65+ years (1 of 2 - PCV13) 11/13/1986    MEDICARE YEARLY EXAM  04/22/2019    Influenza Age 9 to Adult  08/01/2019           1. Have you been to the ER, urgent care clinic since your last visit? Hospitalized since your last visit? No    2. Have you seen or consulted any other health care providers outside of the [address] [place] [address] since your last visit? Include any pap smears or colon screening. No      Patient is interested in the flu vaccine today. Patient states she is not up to date on Tetanus, Shingles, or Pneumonia vaccine.

## 2019-09-20 DIAGNOSIS — Z78.0 MENOPAUSE: ICD-10-CM

## 2019-11-04 ENCOUNTER — CLINICAL SUPPORT (OUTPATIENT)
Dept: FAMILY MEDICINE CLINIC | Age: 84
End: 2019-11-04

## 2019-11-04 ENCOUNTER — TELEPHONE (OUTPATIENT)
Dept: FAMILY MEDICINE CLINIC | Age: 84
End: 2019-11-04

## 2019-11-04 VITALS — DIASTOLIC BLOOD PRESSURE: 72 MMHG | OXYGEN SATURATION: 99 % | SYSTOLIC BLOOD PRESSURE: 177 MMHG | HEART RATE: 71 BPM

## 2019-11-04 DIAGNOSIS — I10 ESSENTIAL HYPERTENSION: Primary | ICD-10-CM

## 2019-11-04 NOTE — PROGRESS NOTES
Patient presented to office today for blood pressure check. Niece with patient states BP has been elevated at home. Patient denies any chest pain or shortness of breath. Niece brought home BP machine to compare readings. My reading with patient is 177/72  Home BP cuff reading while in office is 161/74    Niece states she has been giving patient an extra 12.5mg of Losartan each day to equal 87.5mg daily. Unable to read previous BP readings. Advised niece to record on pen and paper and return recordings to office. Advised patient and niece that I would present case to Dr. Argenis Ryan and call with any further instructions.

## 2019-11-04 NOTE — TELEPHONE ENCOUNTER
Patient presented to office today for blood pressure check. Niece with patient states BP has been elevated at home. Patient denies any chest pain or shortness of breath. Niece brought home BP machine to compare readings.     My reading with patient is 177/72  Home BP cuff reading while in office is 161/74     Niece states she has been giving patient an extra 12.5mg of Losartan each day to equal 87.5mg daily. Unable to read previous BP readings. Advised niece to record on pen and paper and return recordings to office.     Advised patient and niece that I would present case to Dr. Feli Ca and call with any further instructions.

## 2019-11-04 NOTE — TELEPHONE ENCOUNTER
Patient diastolic number which is the lower part number is within normal range    Advised patient and her family to monitor the blood pressure daily, and follow-up in 2 weeks

## 2019-11-04 NOTE — TELEPHONE ENCOUNTER
Spoke with niece and notified. She scheduled an appointment in 2 weeks for follow up and was advised to record BP readings and bring in to appointment.

## 2019-11-16 DIAGNOSIS — I10 ESSENTIAL HYPERTENSION: ICD-10-CM

## 2019-11-17 RX ORDER — LOSARTAN POTASSIUM 50 MG/1
75 TABLET ORAL DAILY
Qty: 135 TAB | Refills: 1 | Status: SHIPPED | OUTPATIENT
Start: 2019-11-17 | End: 2019-12-16 | Stop reason: SDUPTHER

## 2019-11-20 ENCOUNTER — OFFICE VISIT (OUTPATIENT)
Dept: FAMILY MEDICINE CLINIC | Age: 84
End: 2019-11-20

## 2019-11-20 VITALS
BODY MASS INDEX: 28.47 KG/M2 | RESPIRATION RATE: 18 BRPM | TEMPERATURE: 97.9 F | OXYGEN SATURATION: 100 % | DIASTOLIC BLOOD PRESSURE: 80 MMHG | HEIGHT: 59 IN | SYSTOLIC BLOOD PRESSURE: 153 MMHG | WEIGHT: 141.2 LBS | HEART RATE: 99 BPM

## 2019-11-20 DIAGNOSIS — E78.2 MIXED HYPERLIPIDEMIA: ICD-10-CM

## 2019-11-20 DIAGNOSIS — L84 CALLUS OF FOOT: ICD-10-CM

## 2019-11-20 DIAGNOSIS — F01.50 VASCULAR DEMENTIA WITHOUT BEHAVIORAL DISTURBANCE (HCC): ICD-10-CM

## 2019-11-20 DIAGNOSIS — I10 ESSENTIAL HYPERTENSION: Primary | ICD-10-CM

## 2019-11-20 RX ORDER — CLINDAMYCIN HYDROCHLORIDE 150 MG/1
CAPSULE ORAL
Refills: 0 | COMMUNITY
Start: 2019-09-27 | End: 2019-11-20

## 2019-11-20 RX ORDER — IBUPROFEN 600 MG/1
TABLET ORAL
Refills: 0 | COMMUNITY
Start: 2019-09-27 | End: 2019-11-20

## 2019-11-20 RX ORDER — DONEPEZIL HYDROCHLORIDE 5 MG/1
TABLET, FILM COATED ORAL
COMMUNITY
Start: 2019-11-16 | End: 2019-11-20 | Stop reason: SINTOL

## 2019-11-20 NOTE — PROGRESS NOTES
Clementine Melton presents today for Chief Complaint Patient presents with  Hypertension f/u Is someone accompanying this pt? Yes, niece Is the patient using any DME equipment during OV? No 
 
Depression Screening: 
3 most recent PHQ Screens 9/16/2019 Little interest or pleasure in doing things Not at all Feeling down, depressed, irritable, or hopeless Not at all Total Score PHQ 2 0 Learning Assessment: 
Learning Assessment 9/16/2019 PRIMARY LEARNER Patient PRIMARY LANGUAGE ENGLISH  
LEARNER PREFERENCE PRIMARY VIDEOS  
  PICTURES  
  DEMONSTRATION  
ANSWERED BY patient RELATIONSHIP SELF Abuse Screening: 
Abuse Screening Questionnaire 9/16/2019 Do you ever feel afraid of your partner? Jolan Harp Are you in a relationship with someone who physically or mentally threatens you? Jolan Harp Is it safe for you to go home? Vladislav Thapa Fall Risk Fall Risk Assessment, last 12 mths 9/16/2019 Able to walk? Yes Fall in past 12 months? No  
Fall with injury? -  
Number of falls in past 12 months - Fall Risk Score - Health Maintenance reviewed and discussed and ordered per Provider. Health Maintenance Due Topic Date Due  
 DTaP/Tdap/Td series (1 - Tdap) 11/13/1932  Shingrix Vaccine Age 50> (1 of 2) 11/13/1971  GLAUCOMA SCREENING Q2Y  11/13/1986  Pneumococcal 65+ years (1 of 1 - PPSV23) 11/13/1986 Agueda Prost Patient was advised she is due for Tetanus and Shingles vaccines. She was advised to obtain these from pharmacy. Pt currently taking Antiplatelet therapy? No 
 
Coordination of Care: 1. Have you been to the ER, urgent care clinic since your last visit? Hospitalized since your last visit? No 
 
2. Have you seen or consulted any other health care providers outside of the [address] [place] [address] since your last visit? Include any pap smears or colon screening.  No

## 2019-11-20 NOTE — PROGRESS NOTES
Raul Rubalcava Chief Complaint Patient presents with  Hypertension f/u Vitals:  
 11/20/19 1016 BP: 153/80 Pulse: 99 Resp: 18 Temp: 97.9 °F (36.6 °C) TempSrc: Oral  
SpO2: 100% Weight: 141 lb 3.2 oz (64 kg) Height: 4' 11\" (1.499 m) PainSc:   0 - No pain HPI:  Is here for follow up on hypertension ,she is here with her niece There is no acute complaints today her niece saying that she has been forgetful, and sometimes does not fully understand conversation,, she has been going to  about Few times a week I have asked the patient and she likes it, she says that activities and she like the group. She had callus in her left foot and the plantar surface she will follow that up with podiatrist for removal.   
 
 
And she stopped it due to patient was seen by the [facility] and was started on Aricept for dementia  and she stopped it due to diarrhea Past Medical History:  
Diagnosis Date  Arthritis  Hypertension Past Surgical History:  
Procedure Laterality Date  HX HIP REPLACEMENT Social History Tobacco Use  Smoking status: Never Smoker  Smokeless tobacco: Never Used Substance Use Topics  Alcohol use: Not Currently Family History Problem Relation Age of Onset  No Known Problems Mother  No Known Problems Father Review of Systems Constitutional: Negative for chills, fever, malaise/fatigue and weight loss. HENT: Negative for congestion, ear discharge, ear pain, hearing loss, nosebleeds, sinus pain and sore throat. Eyes: Negative for blurred vision, double vision and discharge. Respiratory: Negative for cough, hemoptysis, sputum production, shortness of breath and wheezing. Cardiovascular: Negative for chest pain, palpitations, claudication and leg swelling. Gastrointestinal: Negative for abdominal pain, constipation, diarrhea, nausea and vomiting. Genitourinary: Negative for dysuria, frequency and urgency. Musculoskeletal: Positive for joint pain. Negative for myalgias. Skin: Negative for itching and rash. Neurological: Negative for dizziness, tingling, sensory change, speech change, focal weakness, seizures, loss of consciousness, weakness and headaches. Psychiatric/Behavioral: Negative for depression, substance abuse and suicidal ideas. The patient is not nervous/anxious. Physical Exam 
Constitutional:   
   General: She is not in acute distress. Appearance: She is well-developed. She is not diaphoretic. HENT:  
   Head: Normocephalic and atraumatic. Mouth/Throat:  
   Pharynx: No oropharyngeal exudate. Eyes:  
   General: No scleral icterus. Right eye: No discharge. Left eye: No discharge. Neck:  
   Thyroid: No thyromegaly. Cardiovascular:  
   Rate and Rhythm: Normal rate and regular rhythm. Heart sounds: Normal heart sounds. No murmur. Pulmonary:  
   Effort: Pulmonary effort is normal. No respiratory distress. Breath sounds: Normal breath sounds. No wheezing or rales. Chest:  
   Chest wall: No tenderness. Abdominal:  
   General: There is no distension. Palpations: Abdomen is soft. Tenderness: There is no tenderness. There is no rebound. Musculoskeletal: Normal range of motion. General: No tenderness or deformity. Lymphadenopathy:  
   Cervical: No cervical adenopathy. Skin: 
   General: Skin is warm and dry. Coloration: Skin is not pale. Findings: Lesion present. No erythema or rash. Comments: Right foot callus in the planter surface slightly tender to touch Neurological:  
   Mental Status: She is alert and oriented to person, place, and time. Cranial Nerves: No cranial nerve deficit. Coordination: Coordination normal.  
Psychiatric:     
   Mood and Affect: Mood normal.     
   Behavior: Behavior normal.  
 
  
 
Assessment and plan Plan of care has been discussed with the patient, he agrees to the plan and verbalized understanding. All his questions were answered More than 50% of the time spent in this visit was counseling the patient about  illness and treatment options 1. Essential hypertension Blood pressure reading has been high at home ,today BP readings are acceptable Monitor blood pressure for the next 3 weeks if high increase losartan to 100 or change to a more potent ARB 2. Mixed hyperlipidemia 
 not on statin 3. Callus of foot Will follow up with  for removal 
 
4. Vascular dementia without behavioral disturbance ([facility] [address] She is has stopped taking Aricept to diarrhea, will follow up at the [facility] [place]  
 
Current Outpatient Medications Medication Sig Dispense Refill  losartan (COZAAR) 50 mg tablet Take 1.5 Tabs by mouth daily for 90 days. 135 Tab 1 Patient Active Problem List  
 Diagnosis Date Noted  Essential hypertension 09/16/2019  Left hip pain 05/20/2019  
 History of left hip replacement 05/20/2019  Systolic hypertension 09/88/1434  Mixed hyperlipidemia 05/11/2019  Dementia without behavioral disturbance ([facility] [address] 05/11/2019 Results for orders placed or performed during the hospital encounter of 09/16/19 CBC WITH AUTOMATED DIFF Result Value Ref Range WBC 5.5 4.6 - 13.2 K/uL  
 RBC 4.12 (L) 4.20 - 5.30 M/uL  
 HGB 12.0 12.0 - 16.0 g/dL HCT 36.8 35.0 - 45.0 % MCV 89.3 74.0 - 97.0 FL  
 MCH 29.1 24.0 - 34.0 PG  
 MCHC 32.6 31.0 - 37.0 g/dL  
 RDW 14.9 (H) 11.6 - 14.5 % PLATELET 363 213 - 377 K/uL MPV 11.4 9.2 - 11.8 FL  
 NEUTROPHILS 72 40 - 73 % LYMPHOCYTES 20 (L) 21 - 52 % MONOCYTES 7 3 - 10 % EOSINOPHILS 1 0 - 5 % BASOPHILS 0 0 - 2 %  
 ABS. NEUTROPHILS 4.0 1.8 - 8.0 K/UL  
 ABS. LYMPHOCYTES 1.1 0.9 - 3.6 K/UL  
 ABS. MONOCYTES 0.4 0.05 - 1.2 K/UL  
 ABS. EOSINOPHILS 0.0 0.0 - 0.4 K/UL  
 ABS. BASOPHILS 0.0 0.0 - 0.1 K/UL DF AUTOMATED METABOLIC PANEL, COMPREHENSIVE Result Value Ref Range Sodium 138 136 - 145 mmol/L Potassium 3.7 3.5 - 5.5 mmol/L Chloride 104 100 - 111 mmol/L  
 CO2 24 21 - 32 mmol/L Anion gap 10 3.0 - 18 mmol/L Glucose 100 (H) 74 - 99 mg/dL BUN 16 7.0 - 18 MG/DL Creatinine 0.98 0.6 - 1.3 MG/DL  
 BUN/Creatinine ratio 16 12 - 20 GFR est AA >60 >60 ml/min/1.73m2 GFR est non-AA 53 (L) >60 ml/min/1.73m2 Calcium 9.0 8.5 - 10.1 MG/DL Bilirubin, total 0.4 0.2 - 1.0 MG/DL  
 ALT (SGPT) 15 13 - 56 U/L  
 AST (SGOT) 15 10 - 38 U/L Alk. phosphatase 76 45 - 117 U/L Protein, total 7.2 6.4 - 8.2 g/dL Albumin 3.6 3.4 - 5.0 g/dL Globulin 3.6 2.0 - 4.0 g/dL A-G Ratio 1.0 0.8 - 1.7 Hospital Outpatient Visit on 09/16/2019 Component Date Value Ref Range Status  WBC 09/16/2019 5.5  4.6 - 13.2 K/uL Final  
 RBC 09/16/2019 4.12* 4.20 - 5.30 M/uL Final  
 HGB 09/16/2019 12.0  12.0 - 16.0 g/dL Final  
 HCT 09/16/2019 36.8  35.0 - 45.0 % Final  
 MCV 09/16/2019 89.3  74.0 - 97.0 FL Final  
 MCH 09/16/2019 29.1  24.0 - 34.0 PG Final  
 MCHC 09/16/2019 32.6  31.0 - 37.0 g/dL Final  
 RDW 09/16/2019 14.9* 11.6 - 14.5 % Final  
 PLATELET 05/04/0649 618  135 - 420 K/uL Final  
 MPV 09/16/2019 11.4  9.2 - 11.8 FL Final  
 NEUTROPHILS 09/16/2019 72  40 - 73 % Final  
 LYMPHOCYTES 09/16/2019 20* 21 - 52 % Final  
 MONOCYTES 09/16/2019 7  3 - 10 % Final  
 EOSINOPHILS 09/16/2019 1  0 - 5 % Final  
 BASOPHILS 09/16/2019 0  0 - 2 % Final  
 ABS. NEUTROPHILS 09/16/2019 4.0  1.8 - 8.0 K/UL Final  
 ABS. LYMPHOCYTES 09/16/2019 1.1  0.9 - 3.6 K/UL Final  
 ABS. MONOCYTES 09/16/2019 0.4  0.05 - 1.2 K/UL Final  
 ABS. EOSINOPHILS 09/16/2019 0.0  0.0 - 0.4 K/UL Final  
 ABS.  BASOPHILS 09/16/2019 0.0  0.0 - 0.1 K/UL Final  
 DF 09/16/2019 AUTOMATED    Final  
 Sodium 09/16/2019 138  136 - 145 mmol/L Final  
 Potassium 09/16/2019 3.7  3.5 - 5.5 mmol/L Final  
  Chloride 09/16/2019 104  100 - 111 mmol/L Final  
 CO2 09/16/2019 24  21 - 32 mmol/L Final  
 Anion gap 09/16/2019 10  3.0 - 18 mmol/L Final  
 Glucose 09/16/2019 100* 74 - 99 mg/dL Final  
 BUN 09/16/2019 16  7.0 - 18 MG/DL Final  
 Creatinine 09/16/2019 0.98  0.6 - 1.3 MG/DL Final  
 BUN/Creatinine ratio 09/16/2019 16  12 - 20   Final  
 GFR est AA 09/16/2019 >60  >60 ml/min/1.73m2 Final  
 GFR est non-AA 09/16/2019 53* >60 ml/min/1.73m2 Final  
 Comment: (NOTE) Estimated GFR is calculated using the Modification of Diet in Renal  
Disease (MDRD) Study equation, reported for both  Americans [facility]) and non- Americans (GFRNA), and normalized to 1.73m2  
body surface area. The physician must decide which value applies to  
the patient. The MDRD study equation should only be used in  
individuals age 25 or older. It has not been validated for the  
following: pregnant women, patients with serious comorbid conditions,  
or on certain medications, or persons with extremes of body size,  
muscle mass, or nutritional status.  Calcium 09/16/2019 9.0  8.5 - 10.1 MG/DL Final  
 Bilirubin, total 09/16/2019 0.4  0.2 - 1.0 MG/DL Final  
 ALT (SGPT) 09/16/2019 15  13 - 56 U/L Final  
 AST (SGOT) 09/16/2019 15  10 - 38 U/L Final  
 Alk. phosphatase 09/16/2019 76  45 - 117 U/L Final  
 Protein, total 09/16/2019 7.2  6.4 - 8.2 g/dL Final  
 Albumin 09/16/2019 3.6  3.4 - 5.0 g/dL Final  
 Globulin 09/16/2019 3.6  2.0 - 4.0 g/dL Final  
 A-G Ratio 09/16/2019 1.0  0.8 - 1.7   Final  
  
 
 
Follow-up and Dispositions · Return in about 2 months (around 1/20/2020).

## 2019-12-16 ENCOUNTER — OFFICE VISIT (OUTPATIENT)
Dept: FAMILY MEDICINE CLINIC | Age: 84
End: 2019-12-16

## 2019-12-16 ENCOUNTER — HOSPITAL ENCOUNTER (OUTPATIENT)
Dept: LAB | Age: 84
Discharge: HOME | End: 2019-12-16
Payer: MEDICARE

## 2019-12-16 VITALS
DIASTOLIC BLOOD PRESSURE: 60 MMHG | OXYGEN SATURATION: 99 % | RESPIRATION RATE: 22 BRPM | HEART RATE: 94 BPM | WEIGHT: 141 LBS | SYSTOLIC BLOOD PRESSURE: 133 MMHG | HEIGHT: 59 IN | TEMPERATURE: 97.9 F | BODY MASS INDEX: 28.43 KG/M2

## 2019-12-16 DIAGNOSIS — Z23 ENCOUNTER FOR IMMUNIZATION: ICD-10-CM

## 2019-12-16 DIAGNOSIS — E78.2 MIXED HYPERLIPIDEMIA: ICD-10-CM

## 2019-12-16 DIAGNOSIS — I10 ESSENTIAL HYPERTENSION: ICD-10-CM

## 2019-12-16 DIAGNOSIS — F03.90 DEMENTIA WITHOUT BEHAVIORAL DISTURBANCE, UNSPECIFIED DEMENTIA TYPE: ICD-10-CM

## 2019-12-16 DIAGNOSIS — I10 ESSENTIAL HYPERTENSION: Primary | ICD-10-CM

## 2019-12-16 LAB
ALBUMIN SERPL-MCNC: 3.5 G/DL (ref 3.4–5)
ALBUMIN/GLOB SERPL: 1 {RATIO} (ref 0.8–1.7)
ALP SERPL-CCNC: 80 U/L (ref 45–117)
ALT SERPL-CCNC: 14 U/L (ref 13–56)
ANION GAP SERPL CALC-SCNC: 6 MMOL/L (ref 3–18)
AST SERPL-CCNC: 17 U/L (ref 10–38)
BILIRUB SERPL-MCNC: 0.5 MG/DL (ref 0.2–1)
BUN SERPL-MCNC: 15 MG/DL (ref 7–18)
BUN/CREAT SERPL: 15 (ref 12–20)
CALCIUM SERPL-MCNC: 9 MG/DL (ref 8.5–10.1)
CHLORIDE SERPL-SCNC: 104 MMOL/L (ref 100–111)
CO2 SERPL-SCNC: 25 MMOL/L (ref 21–32)
CREAT SERPL-MCNC: 1.02 MG/DL (ref 0.6–1.3)
GLOBULIN SER CALC-MCNC: 3.5 G/DL (ref 2–4)
GLUCOSE SERPL-MCNC: 167 MG/DL (ref 74–99)
POTASSIUM SERPL-SCNC: 4.1 MMOL/L (ref 3.5–5.5)
PROT SERPL-MCNC: 7 G/DL (ref 6.4–8.2)
SODIUM SERPL-SCNC: 135 MMOL/L (ref 136–145)

## 2019-12-16 PROCEDURE — 36415 COLL VENOUS BLD VENIPUNCTURE: CPT

## 2019-12-16 PROCEDURE — 80053 COMPREHEN METABOLIC PANEL: CPT

## 2019-12-16 RX ORDER — HYDROCHLOROTHIAZIDE 12.5 MG/1
12.5 TABLET ORAL DAILY
Qty: 90 TAB | Refills: 0 | Status: SHIPPED | OUTPATIENT
Start: 2019-12-16 | End: 2020-03-15

## 2019-12-16 RX ORDER — LOSARTAN POTASSIUM 25 MG/1
25 TABLET ORAL DAILY
COMMUNITY
End: 2020-02-10

## 2019-12-16 RX ORDER — LOSARTAN POTASSIUM 50 MG/1
50 TABLET ORAL DAILY
COMMUNITY
End: 2020-05-21

## 2019-12-16 NOTE — PROGRESS NOTES
Marianela Franco     Chief Complaint   Patient presents with    Hypertension     f/u    Cholesterol Problem     f/u     Vitals:    12/16/19 0928 12/16/19 0930   BP: 133/60    Pulse: 94    Resp: 22    Temp: 97.9 °F (36.6 °C)    TempSrc: Oral    SpO2: 99%    Weight: 141 lb (64 kg)    Height: 4' 11\" (1.499 m)    PainSc:   0 - No pain   0 - No pain         HPI: Ms. Reggie Suarez is here accompanied by her niece as usual for follow-up on hypertension blood pressure reading has improved and she had some blood pressure readings from home they are    176/87, 139/71, 160/77, 197/78, 193/81, 149/71, 144/74, 157/78, and 141/ 75 patient has no complaint of shortness of breath no chest pain no dizziness      She had a scheduled surgery with podiatrist for her left foot she had lytic lesion in the bottom of her foot it is a wart/callus    She goes 3 times a week to like a  center  [employer] , patient she said she will accident in June the bus ride, and he does all the activities. Past Medical History:   Diagnosis Date    Arthritis     Hypertension      Past Surgical History:   Procedure Laterality Date    HX HIP REPLACEMENT       Social History     Tobacco Use    Smoking status: Never Smoker    Smokeless tobacco: Never Used   Substance Use Topics    Alcohol use: Not Currently       Family History   Problem Relation Age of Onset    No Known Problems Mother     No Known Problems Father        Review of Systems   Constitutional: Negative for chills, fever, malaise/fatigue and weight loss. HENT: Negative for congestion, ear discharge, ear pain, hearing loss, nosebleeds, sinus pain and sore throat. Eyes: Negative for blurred vision, double vision and discharge. Respiratory: Positive for cough and sputum production. Cardiovascular: Negative for chest pain, palpitations, claudication and leg swelling.    Gastrointestinal: Negative for abdominal pain, constipation, diarrhea, nausea and vomiting. Genitourinary: Negative for dysuria, frequency and urgency. Musculoskeletal: Positive for myalgias. Left foot pain    Skin: Negative for itching and rash. Neurological: Negative for dizziness, tingling, sensory change, speech change, focal weakness, weakness and headaches. Psychiatric/Behavioral: Positive for memory loss. Negative for depression and suicidal ideas. The patient is nervous/anxious. Physical Exam  Vitals signs and nursing note reviewed. Constitutional:       General: She is not in acute distress. Appearance: She is well-developed. She is not diaphoretic. HENT:      Head: Normocephalic and atraumatic. Mouth/Throat:      Pharynx: No oropharyngeal exudate. Eyes:      General: No scleral icterus. Right eye: No discharge. Left eye: No discharge. Neck:      Thyroid: No thyromegaly. Cardiovascular:      Rate and Rhythm: Normal rate and regular rhythm. Heart sounds: Normal heart sounds. No murmur. Pulmonary:      Effort: Pulmonary effort is normal. No respiratory distress. Breath sounds: Normal breath sounds. No wheezing or rales. Chest:      Chest wall: No tenderness. Abdominal:      General: There is no distension. Palpations: Abdomen is soft. Tenderness: There is no tenderness. There is no rebound. Musculoskeletal:         General: No tenderness or deformity. Comments: She is using a walker    Lymphadenopathy:      Cervical: No cervical adenopathy. Skin:     General: Skin is warm and dry. Coloration: Skin is not pale. Findings: No erythema or rash. Neurological:      Mental Status: She is alert and oriented to person, place, and time. Cranial Nerves: No cranial nerve deficit.       Coordination: Coordination normal.   Psychiatric:         Mood and Affect: Mood normal.         Behavior: Behavior normal.          Assessment and plan     Plan of care has been discussed with the patient, he agrees to the plan and verbalized understanding. All his questions were answered  More than 50% of the time spent in this visit was counseling the patient about  illness and treatment options         1. Essential hypertension  To add hydrochlorothiazide 12.5 mg daily advised her he is to continue monitoring blood pressure  And send me report weekly via my chart  - hydroCHLOROthiazide (HYDRODIURIL) 12.5 mg tablet; Take 1 Tab by mouth daily. Dispense: 90 Tab; Refill: 0  - METABOLIC PANEL, COMPREHENSIVE; Future    2. Mixed hyperlipidemia  She is not on statins at this time    - METABOLIC PANEL, COMPREHENSIVE; Future    3. Dementia without behavioral disturbance, unspecified dementia type [facility])  Patient has been stable she have a follow-up early next year neurology    4. Encounter for immunization  Pneumonia vaccine was given with no complication   0.5 mL RD IM MSD [id number] PNEUMOVAX 23           - PNEUMOCOCCAL POLYSACCHARIDE VACCINE, 23-VALENT, ADULT OR IMMUNOSUPPRESSED PT DOSE,  - ADMIN INFLUENZA VIRUS VAC    Current Outpatient Medications   Medication Sig Dispense Refill    losartan (COZAAR) 25 mg tablet Take 25 mg by mouth daily.  losartan (COZAAR) 50 mg tablet Take 50 mg by mouth daily.  hydroCHLOROthiazide (HYDRODIURIL) 12.5 mg tablet Take 1 Tab by mouth daily.  90 Tab 0       Patient Active Problem List    Diagnosis Date Noted    Essential hypertension 09/16/2019    Left hip pain 05/20/2019    History of left hip replacement 81/83/7152    Systolic hypertension 98/53/0117    Mixed hyperlipidemia 05/11/2019    Dementia without behavioral disturbance ([facility] [address] 05/11/2019     Results for orders placed or performed during the hospital encounter of 09/16/19   CBC WITH AUTOMATED DIFF   Result Value Ref Range    WBC 5.5 4.6 - 13.2 K/uL    RBC 4.12 (L) 4.20 - 5.30 M/uL    HGB 12.0 12.0 - 16.0 g/dL    HCT 36.8 35.0 - 45.0 %    MCV 89.3 74.0 - 97.0 FL    MCH 29.1 24.0 - 34.0 PG    MCHC 32.6 31.0 - 37.0 g/dL RDW 14.9 (H) 11.6 - 14.5 %    PLATELET 922 431 - 953 K/uL    MPV 11.4 9.2 - 11.8 FL    NEUTROPHILS 72 40 - 73 %    LYMPHOCYTES 20 (L) 21 - 52 %    MONOCYTES 7 3 - 10 %    EOSINOPHILS 1 0 - 5 %    BASOPHILS 0 0 - 2 %    ABS. NEUTROPHILS 4.0 1.8 - 8.0 K/UL    ABS. LYMPHOCYTES 1.1 0.9 - 3.6 K/UL    ABS. MONOCYTES 0.4 0.05 - 1.2 K/UL    ABS. EOSINOPHILS 0.0 0.0 - 0.4 K/UL    ABS. BASOPHILS 0.0 0.0 - 0.1 K/UL    DF AUTOMATED     METABOLIC PANEL, COMPREHENSIVE   Result Value Ref Range    Sodium 138 136 - 145 mmol/L    Potassium 3.7 3.5 - 5.5 mmol/L    Chloride 104 100 - 111 mmol/L    CO2 24 21 - 32 mmol/L    Anion gap 10 3.0 - 18 mmol/L    Glucose 100 (H) 74 - 99 mg/dL    BUN 16 7.0 - 18 MG/DL    Creatinine 0.98 0.6 - 1.3 MG/DL    BUN/Creatinine ratio 16 12 - 20      GFR est AA >60 >60 ml/min/1.73m2    GFR est non-AA 53 (L) >60 ml/min/1.73m2    Calcium 9.0 8.5 - 10.1 MG/DL    Bilirubin, total 0.4 0.2 - 1.0 MG/DL    ALT (SGPT) 15 13 - 56 U/L    AST (SGOT) 15 10 - 38 U/L    Alk.  phosphatase 76 45 - 117 U/L    Protein, total 7.2 6.4 - 8.2 g/dL    Albumin 3.6 3.4 - 5.0 g/dL    Globulin 3.6 2.0 - 4.0 g/dL    A-G Ratio 1.0 0.8 - 1.7       Hospital Outpatient Visit on 12/16/2019   Component Date Value Ref Range Status    Sodium 12/16/2019 135* 136 - 145 mmol/L Final    Potassium 12/16/2019 4.1  3.5 - 5.5 mmol/L Final    Chloride 12/16/2019 104  100 - 111 mmol/L Final    CO2 12/16/2019 25  21 - 32 mmol/L Final    Anion gap 12/16/2019 6  3.0 - 18 mmol/L Final    Glucose 12/16/2019 167* 74 - 99 mg/dL Final    BUN 12/16/2019 15  7.0 - 18 MG/DL Final    Creatinine 12/16/2019 1.02  0.6 - 1.3 MG/DL Final    BUN/Creatinine ratio 12/16/2019 15  12 - 20   Final    GFR est AA 12/16/2019 >60  >60 ml/min/1.73m2 Final    GFR est non-AA 12/16/2019 50* >60 ml/min/1.73m2 Final    Comment: (NOTE)  Estimated GFR is calculated using the Modification of Diet in Renal   Disease (MDRD) Study equation, reported for both  Americans [facility]) and non- Americans (GFRNA), and normalized to 1.73m2   body surface area. The physician must decide which value applies to   the patient. The MDRD study equation should only be used in   individuals age 25 or older. It has not been validated for the   following: pregnant women, patients with serious comorbid conditions,   or on certain medications, or persons with extremes of body size,   muscle mass, or nutritional status.  Calcium 12/16/2019 9.0  8.5 - 10.1 MG/DL Final    Bilirubin, total 12/16/2019 0.5  0.2 - 1.0 MG/DL Final    ALT (SGPT) 12/16/2019 14  13 - 56 U/L Final    AST (SGOT) 12/16/2019 17  10 - 38 U/L Final    Alk. phosphatase 12/16/2019 80  45 - 117 U/L Final    Protein, total 12/16/2019 7.0  6.4 - 8.2 g/dL Final    Albumin 12/16/2019 3.5  3.4 - 5.0 g/dL Final    Globulin 12/16/2019 3.5  2.0 - 4.0 g/dL Final    A-G Ratio 12/16/2019 1.0  0.8 - 1.7   Final          Follow-up and Dispositions    · Return in about 3 months (around 3/16/2020).

## 2019-12-16 NOTE — PROGRESS NOTES
Raul Rubalcava presents today for   Chief Complaint   Patient presents with    Hypertension     f/u    Cholesterol Problem     f/u       Is someone accompanying this pt? yes    Is the patient using any DME equipment during OV? yes    Depression Screening:  3 most recent PHQ Screens 9/16/2019   Little interest or pleasure in doing things Not at all   Feeling down, depressed, irritable, or hopeless Not at all   Total Score PHQ 2 0       Learning Assessment:  Learning Assessment 9/16/2019   PRIMARY LEARNER Patient   PRIMARY LANGUAGE ENGLISH   LEARNER PREFERENCE PRIMARY VIDEOS     PICTURES     DEMONSTRATION   ANSWERED BY patient   RELATIONSHIP SELF       Abuse Screening:  Abuse Screening Questionnaire 9/16/2019   Do you ever feel afraid of your partner? N   Are you in a relationship with someone who physically or mentally threatens you? N   Is it safe for you to go home? Y       Fall Risk  Fall Risk Assessment, last 12 mths 9/16/2019   Able to walk? Yes   Fall in past 12 months? No   Fall with injury? -   Number of falls in past 12 months -   Fall Risk Score -       Health Maintenance reviewed and discussed and ordered per Provider. Health Maintenance Due   Topic Date Due    DTaP/Tdap/Td series (1 - Tdap) 11/13/1932    Shingrix Vaccine Age 50> (1 of 2) 11/13/1971    GLAUCOMA SCREENING Q2Y  11/13/1986    Pneumococcal 65+ years (1 of 1 - PPSV23) 11/13/1986   . Pt currently taking Antiplatelet therapy? no    Coordination of Care:  1. Have you been to the ER, urgent care clinic since your last visit? Hospitalized since your last visit? no    2. Have you seen or consulted any other health care providers outside of the [address] [place] [address] since your last visit? Include any pap smears or colon screening. no    Raul Rubalcava is a 80 y.o. female who presents for routine immunizations. The patient denies any symptoms , reactions or allergies that would exclude them from being immunized today.   Risks and adverse reactions were discussed and the VIS was given to them. All questions were addressed. The patient was observed for 10 minutes post injection. There were no reactions observed. After obtaining informed consent, the immunization is given by Imani Foster CMA.

## 2019-12-17 ENCOUNTER — TELEPHONE (OUTPATIENT)
Dept: FAMILY MEDICINE CLINIC | Age: 84
End: 2019-12-17

## 2019-12-17 NOTE — TELEPHONE ENCOUNTER
----- Message from Stephanie Betancourt MD sent at 12/16/2019  8:05 PM EST -----  Results are unremarkable except for slightly elevated blood sugar, advised the patient to avoid sweets  and sugary drinks or.

## 2019-12-17 NOTE — TELEPHONE ENCOUNTER
Attempted to contact pt's caitlyn to discuss results, LVM for pt to return call at [facility]

## 2019-12-17 NOTE — PROGRESS NOTES
Results are unremarkable except for slightly elevated blood sugar, advised the patient to avoid sweets  and sugary drinks or.

## 2019-12-17 NOTE — TELEPHONE ENCOUNTER
Patient's niece contacted with results per PCP, verified 2 patient identifiers. She has no further questions or concerns at this time.

## 2019-12-18 ENCOUNTER — TELEPHONE (OUTPATIENT)
Dept: FAMILY MEDICINE CLINIC | Age: 84
End: 2019-12-18

## 2019-12-18 NOTE — TELEPHONE ENCOUNTER
Please call patient niece to let her know that bone density showed osteopenia which is bone thinning, make sure patient had a calcium rich diet, she can take calcium supplement 1 or 2 tablets daily and also to take vitamin D 2000 unit daily, increase activity also will help

## 2020-01-08 ENCOUNTER — OFFICE VISIT (OUTPATIENT)
Dept: FAMILY MEDICINE CLINIC | Age: 85
End: 2020-01-08

## 2020-01-08 VITALS
HEIGHT: 59 IN | TEMPERATURE: 98.6 F | BODY MASS INDEX: 27.82 KG/M2 | HEART RATE: 76 BPM | WEIGHT: 138 LBS | DIASTOLIC BLOOD PRESSURE: 90 MMHG | SYSTOLIC BLOOD PRESSURE: 151 MMHG | OXYGEN SATURATION: 97 % | RESPIRATION RATE: 18 BRPM

## 2020-01-08 DIAGNOSIS — I10 SYSTOLIC HYPERTENSION: ICD-10-CM

## 2020-01-08 DIAGNOSIS — J20.9 ACUTE BRONCHITIS, UNSPECIFIED ORGANISM: Primary | ICD-10-CM

## 2020-01-08 RX ORDER — AMOXICILLIN AND CLAVULANATE POTASSIUM 875; 125 MG/1; MG/1
1 TABLET, FILM COATED ORAL EVERY 12 HOURS
Qty: 20 TAB | Refills: 0 | Status: SHIPPED | OUTPATIENT
Start: 2020-01-08 | End: 2020-03-16

## 2020-01-08 RX ORDER — GUAIFENESIN 600 MG/1
600 TABLET, EXTENDED RELEASE ORAL 2 TIMES DAILY
Qty: 20 TAB | Refills: 0 | Status: SHIPPED | OUTPATIENT
Start: 2020-01-08 | End: 2020-03-16

## 2020-01-08 NOTE — PROGRESS NOTES
Yeni Ohara presents today for   Chief Complaint   Patient presents with    Cold Symptoms     cough and congestion x 5-6 days       Is someone accompanying this pt? yes    Is the patient using any DME equipment during OV? yes    Depression Screening:  3 most recent PHQ Screens 9/16/2019   Little interest or pleasure in doing things Not at all   Feeling down, depressed, irritable, or hopeless Not at all   Total Score PHQ 2 0       Learning Assessment:  Learning Assessment 9/16/2019   PRIMARY LEARNER Patient   PRIMARY LANGUAGE ENGLISH   LEARNER PREFERENCE PRIMARY VIDEOS     PICTURES     DEMONSTRATION   ANSWERED BY patient   RELATIONSHIP SELF       Abuse Screening:  Abuse Screening Questionnaire 9/16/2019   Do you ever feel afraid of your partner? N   Are you in a relationship with someone who physically or mentally threatens you? N   Is it safe for you to go home? Y       Fall Risk  Fall Risk Assessment, last 12 mths 9/16/2019   Able to walk? Yes   Fall in past 12 months? No   Fall with injury? -   Number of falls in past 12 months -   Fall Risk Score -       Health Maintenance reviewed and discussed and ordered per Provider. Health Maintenance Due   Topic Date Due    DTaP/Tdap/Td series (1 - Tdap) 11/13/1932    Shingrix Vaccine Age 50> (1 of 2) 11/13/1971    GLAUCOMA SCREENING Q2Y  11/13/1986   . Pt currently taking Antiplatelet therapy? no    Coordination of Care:  1. Have you been to the ER, urgent care clinic since your last visit? Hospitalized since your last visit? no    2. Have you seen or consulted any other health care providers outside of the [address] [place] [address] since your last visit? Include any pap smears or colon screening.  yes

## 2020-01-08 NOTE — PROGRESS NOTES
Lata Boyd     Chief Complaint   Patient presents with   [facility] Cold Symptoms     cough and congestion x 5-6 days     Vitals:    01/08/20 1541   BP: 151/90   Pulse: 76   Resp: 18   Temp: 98.6 °F (37 °C)   TempSrc: Oral   SpO2: 97%   Weight: 138 lb (62.6 kg)   Height: 4' 11\" (1.499 m)   PainSc:   0 - No pain         HPI:Ms. Yasir Landers is here accompanied by her niece complaining about cough congestion, and sore throat for 5 to 6 days, no improvement in her symptoms no fever no chills, no urinary symptoms, cough is productive with sputum and she has occasional shortness of breath associated with cough. There is no change in appetite. And her niece has been giving her Tylenol Cold and flu    Past Medical History:   Diagnosis Date    Arthritis     Hypertension      Past Surgical History:   Procedure Laterality Date    HX HIP REPLACEMENT       Social History     Tobacco Use    Smoking status: Never Smoker    Smokeless tobacco: Never Used   Substance Use Topics    Alcohol use: Not Currently       Family History   Problem Relation Age of Onset    No Known Problems Mother     No Known Problems Father        Review of Systems   Constitutional: Negative for chills, fever, malaise/fatigue and weight loss. HENT: Positive for sore throat. Negative for congestion, ear discharge, ear pain, hearing loss and nosebleeds. Eyes: Negative for blurred vision, double vision and discharge. Respiratory: Positive for cough, sputum production and shortness of breath. Negative for hemoptysis and wheezing. Cardiovascular: Negative for chest pain, palpitations, claudication and leg swelling. Gastrointestinal: Negative for abdominal pain, constipation, diarrhea, nausea and vomiting. Genitourinary: Negative for dysuria, frequency and urgency. Musculoskeletal: Negative for back pain, falls, joint pain, myalgias and neck pain. Skin: Negative for itching and rash.    Neurological: Negative for dizziness, tingling, sensory change, speech change, focal weakness, weakness and headaches. Psychiatric/Behavioral: Negative for depression and suicidal ideas. Physical Exam  Vitals signs and nursing note reviewed. Constitutional:       General: She is not in acute distress. Appearance: She is well-developed. She is not diaphoretic. HENT:      Head: Normocephalic and atraumatic. Mouth/Throat:      Pharynx: No oropharyngeal exudate. Eyes:      General: No scleral icterus. Right eye: No discharge. Left eye: No discharge. Conjunctiva/sclera: Conjunctivae normal.      Pupils: Pupils are equal, round, and reactive to light. Neck:      Thyroid: No thyromegaly. Cardiovascular:      Rate and Rhythm: Normal rate and regular rhythm. Heart sounds: Normal heart sounds. No murmur. Pulmonary:      Effort: Pulmonary effort is normal. No respiratory distress. Breath sounds: Normal breath sounds. No wheezing or rales. Chest:      Chest wall: No tenderness. Abdominal:      General: There is no distension. Palpations: Abdomen is soft. Tenderness: There is no tenderness. There is no rebound. Musculoskeletal: Normal range of motion. General: No tenderness or deformity. Lymphadenopathy:      Cervical: No cervical adenopathy. Skin:     General: Skin is warm and dry. Coloration: Skin is not pale. Findings: No erythema or rash. Neurological:      Mental Status: She is alert and oriented to person, place, and time. Cranial Nerves: No cranial nerve deficit. Coordination: Coordination normal.   Psychiatric:         Behavior: Behavior normal.         Thought Content: Thought content normal.         Judgment: Judgment normal.          Assessment and plan     Plan of care has been discussed with the patient, he agrees to the plan and verbalized understanding.   All his questions were answered  More than 50% of the time spent in this visit was counseling the patient about  illness and treatment options         1. Acute bronchitis, unspecified organism    - guaiFENesin ER (MUCINEX) 600 mg ER tablet; Take 1 Tab by mouth two (2) times a day. Dispense: 20 Tab; Refill: 0  - amoxicillin-clavulanate (AUGMENTIN) 875-125 mg per tablet; Take 1 Tab by mouth every twelve (12) hours. Dispense: 20 Tab; Refill: 0    2. Systolic hypertension  Blood pressure slightly elevated today possibly due to taking decongestant    I have advised her niece   against all over-the-counter decongestant, except for that one specific for people with high blood pressure    Current Outpatient Medications   Medication Sig Dispense Refill    guaiFENesin ER (MUCINEX) 600 mg ER tablet Take 1 Tab by mouth two (2) times a day. 20 Tab 0    amoxicillin-clavulanate (AUGMENTIN) 875-125 mg per tablet Take 1 Tab by mouth every twelve (12) hours. 20 Tab 0    losartan (COZAAR) 25 mg tablet Take 25 mg by mouth daily.  losartan (COZAAR) 50 mg tablet Take 50 mg by mouth daily.  hydroCHLOROthiazide (HYDRODIURIL) 12.5 mg tablet Take 1 Tab by mouth daily.  90 Tab 0       Patient Active Problem List    Diagnosis Date Noted    Essential hypertension 09/16/2019    Left hip pain 05/20/2019    History of left hip replacement 00/32/1028    Systolic hypertension 54/95/4345    Mixed hyperlipidemia 05/11/2019    Dementia without behavioral disturbance ([facility] [address] 05/11/2019     Results for orders placed or performed during the hospital encounter of 53/39/13   METABOLIC PANEL, COMPREHENSIVE   Result Value Ref Range    Sodium 135 (L) 136 - 145 mmol/L    Potassium 4.1 3.5 - 5.5 mmol/L    Chloride 104 100 - 111 mmol/L    CO2 25 21 - 32 mmol/L    Anion gap 6 3.0 - 18 mmol/L    Glucose 167 (H) 74 - 99 mg/dL    BUN 15 7.0 - 18 MG/DL    Creatinine 1.02 0.6 - 1.3 MG/DL    BUN/Creatinine ratio 15 12 - 20      GFR est AA >60 >60 ml/min/1.73m2    GFR est non-AA 50 (L) >60 ml/min/1.73m2    Calcium 9.0 8.5 - 10.1 MG/DL Bilirubin, total 0.5 0.2 - 1.0 MG/DL    ALT (SGPT) 14 13 - 56 U/L    AST (SGOT) 17 10 - 38 U/L    Alk. phosphatase 80 45 - 117 U/L    Protein, total 7.0 6.4 - 8.2 g/dL    Albumin 3.5 3.4 - 5.0 g/dL    Globulin 3.5 2.0 - 4.0 g/dL    A-G Ratio 1.0 0.8 - 1.7       Hospital Outpatient Visit on 12/16/2019   Component Date Value Ref Range Status    Sodium 12/16/2019 135* 136 - 145 mmol/L Final    Potassium 12/16/2019 4.1  3.5 - 5.5 mmol/L Final    Chloride 12/16/2019 104  100 - 111 mmol/L Final    CO2 12/16/2019 25  21 - 32 mmol/L Final    Anion gap 12/16/2019 6  3.0 - 18 mmol/L Final    Glucose 12/16/2019 167* 74 - 99 mg/dL Final    BUN 12/16/2019 15  7.0 - 18 MG/DL Final    Creatinine 12/16/2019 1.02  0.6 - 1.3 MG/DL Final    BUN/Creatinine ratio 12/16/2019 15  12 - 20   Final    GFR est AA 12/16/2019 >60  >60 ml/min/1.73m2 Final    GFR est non-AA 12/16/2019 50* >60 ml/min/1.73m2 Final    Comment: (NOTE)  Estimated GFR is calculated using the Modification of Diet in Renal   Disease (MDRD) Study equation, reported for both  Americans   (GFRAA) and non- Americans (GFRNA), and normalized to 1.73m2   body surface area. The physician must decide which value applies to   the patient. The MDRD study equation should only be used in   individuals age 25 or older. It has not been validated for the   following: pregnant women, patients with serious comorbid conditions,   or on certain medications, or persons with extremes of body size,   muscle mass, or nutritional status.  Calcium 12/16/2019 9.0  8.5 - 10.1 MG/DL Final    Bilirubin, total 12/16/2019 0.5  0.2 - 1.0 MG/DL Final    ALT (SGPT) 12/16/2019 14  13 - 56 U/L Final    AST (SGOT) 12/16/2019 17  10 - 38 U/L Final    Alk.  phosphatase 12/16/2019 80  45 - 117 U/L Final    Protein, total 12/16/2019 7.0  6.4 - 8.2 g/dL Final    Albumin 12/16/2019 3.5  3.4 - 5.0 g/dL Final    Globulin 12/16/2019 3.5  2.0 - 4.0 g/dL Final    A-G Ratio 12/16/2019 1.0  0.8 - 1.7   Final          Follow-up and Dispositions    · Return if symptoms worsen or fail to improve.

## 2020-01-20 ENCOUNTER — TELEPHONE (OUTPATIENT)
Dept: FAMILY MEDICINE CLINIC | Age: 85
End: 2020-01-20

## 2020-01-20 NOTE — TELEPHONE ENCOUNTER
Mrs Dorothy Membreno called in regards to the pt, she took her bp tday and it was 119/66 and she had some concerns that that was too low for the pt. She is also experiencing some fatigue and some dizzness. Please advise if they need to do anything.

## 2020-01-21 NOTE — TELEPHONE ENCOUNTER
Spoke with patient's niece and gave recommendations. She verbalized understanding and have no further questions or concerns.

## 2020-01-21 NOTE — TELEPHONE ENCOUNTER
Can decrease losartan to 50 mg daily and monitor blood pressure daily please go with her over how to take blood pressure

## 2020-02-10 RX ORDER — LOSARTAN POTASSIUM 25 MG/1
TABLET ORAL
Qty: 90 TAB | Refills: 0 | Status: SHIPPED | OUTPATIENT
Start: 2020-02-10 | End: 2020-03-16

## 2020-03-14 DIAGNOSIS — I10 ESSENTIAL HYPERTENSION: ICD-10-CM

## 2020-03-15 RX ORDER — HYDROCHLOROTHIAZIDE 12.5 MG/1
TABLET ORAL
Qty: 90 TAB | Refills: 0 | Status: SHIPPED | OUTPATIENT
Start: 2020-03-15 | End: 2020-03-16

## 2020-03-16 ENCOUNTER — OFFICE VISIT (OUTPATIENT)
Dept: FAMILY MEDICINE CLINIC | Age: 85
End: 2020-03-16

## 2020-03-16 ENCOUNTER — HOME HEALTH ADMISSION (OUTPATIENT)
Dept: HOME HEALTH SERVICES | Facility: HOME HEALTH | Age: 85
End: 2020-03-16
Payer: MEDICARE

## 2020-03-16 ENCOUNTER — HOSPITAL ENCOUNTER (OUTPATIENT)
Dept: LAB | Age: 85
Discharge: HOME | End: 2020-03-16
Payer: MEDICARE

## 2020-03-16 VITALS
HEIGHT: 59 IN | RESPIRATION RATE: 20 BRPM | HEART RATE: 87 BPM | OXYGEN SATURATION: 100 % | DIASTOLIC BLOOD PRESSURE: 75 MMHG | SYSTOLIC BLOOD PRESSURE: 147 MMHG | WEIGHT: 141 LBS | BODY MASS INDEX: 28.43 KG/M2 | TEMPERATURE: 97.9 F

## 2020-03-16 DIAGNOSIS — E78.2 MIXED HYPERLIPIDEMIA: ICD-10-CM

## 2020-03-16 DIAGNOSIS — I10 ESSENTIAL HYPERTENSION: ICD-10-CM

## 2020-03-16 DIAGNOSIS — I10 ESSENTIAL HYPERTENSION: Primary | ICD-10-CM

## 2020-03-16 DIAGNOSIS — R26.89 FUNCTIONAL GAIT ABNORMALITY: ICD-10-CM

## 2020-03-16 DIAGNOSIS — F03.90 DEMENTIA WITHOUT BEHAVIORAL DISTURBANCE, UNSPECIFIED DEMENTIA TYPE: ICD-10-CM

## 2020-03-16 LAB
ALBUMIN SERPL-MCNC: 3.3 G/DL (ref 3.4–5)
ALBUMIN/GLOB SERPL: 0.9 {RATIO} (ref 0.8–1.7)
ALP SERPL-CCNC: 78 U/L (ref 45–117)
ALT SERPL-CCNC: 16 U/L (ref 13–56)
ANION GAP SERPL CALC-SCNC: 10 MMOL/L (ref 3–18)
AST SERPL-CCNC: 16 U/L (ref 10–38)
BILIRUB SERPL-MCNC: 0.5 MG/DL (ref 0.2–1)
BUN SERPL-MCNC: 16 MG/DL (ref 7–18)
BUN/CREAT SERPL: 18 (ref 12–20)
CALCIUM SERPL-MCNC: 8.8 MG/DL (ref 8.5–10.1)
CHLORIDE SERPL-SCNC: 107 MMOL/L (ref 100–111)
CO2 SERPL-SCNC: 23 MMOL/L (ref 21–32)
CREAT SERPL-MCNC: 0.91 MG/DL (ref 0.6–1.3)
GLOBULIN SER CALC-MCNC: 3.8 G/DL (ref 2–4)
GLUCOSE SERPL-MCNC: 112 MG/DL (ref 74–99)
POTASSIUM SERPL-SCNC: 4.4 MMOL/L (ref 3.5–5.5)
PROT SERPL-MCNC: 7.1 G/DL (ref 6.4–8.2)
SODIUM SERPL-SCNC: 140 MMOL/L (ref 136–145)

## 2020-03-16 PROCEDURE — 80053 COMPREHEN METABOLIC PANEL: CPT

## 2020-03-16 PROCEDURE — 36415 COLL VENOUS BLD VENIPUNCTURE: CPT

## 2020-03-16 NOTE — PROGRESS NOTES
Gabrielle Smith Chief Complaint Patient presents with  Hypertension  Cholesterol Problem Vitals:  
 03/16/20 9768 03/16/20 1918 BP: 145/75 147/75 Pulse: 87 Resp: 20 Temp: 97.9 °F (36.6 °C) TempSrc: Oral   
SpO2: 100% Weight: 141 lb (64 kg) Height: 4' 11\" (1.499 m) PainSc:   0 - No pain HPI:Ms. Castillo Tapia is here for follow up accompanied by her niece,she is doing well no complains BP is well controlled she is only taking losartan 50 mg daily She has been doing well no acute complain no emergency room visit She followed up with podiatrist for her left foot wart and callus removal. 
 
[facility] in Feb will get records Her niece is requesting physical therapy to evaluate her for better options for her walker. Patient currently not attending  program due to current coronavirus watch, advised her to avoid all crowds stay well hydrated Past Medical History:  
Diagnosis Date  Arthritis  Hypertension Past Surgical History:  
Procedure Laterality Date  HX HIP REPLACEMENT Social History Tobacco Use  Smoking status: Never Smoker  Smokeless tobacco: Never Used Substance Use Topics  Alcohol use: Not Currently Family History Problem Relation Age of Onset  No Known Problems Mother  No Known Problems Father Review of Systems Constitutional: Negative for chills, fever, malaise/fatigue and weight loss. HENT: Negative for congestion, ear discharge, ear pain, hearing loss, nosebleeds, sinus pain and sore throat. Eyes: Negative for blurred vision, double vision and discharge. Respiratory: Negative for cough. Cardiovascular: Negative for chest pain, palpitations, claudication and leg swelling. Gastrointestinal: Negative for abdominal pain, constipation, diarrhea, nausea and vomiting. Genitourinary: Negative for dysuria, frequency and urgency. Musculoskeletal: Negative for myalgias. Skin: Negative for itching and rash. Neurological: Negative for dizziness, tingling, sensory change, speech change, focal weakness, seizures, weakness and headaches. Psychiatric/Behavioral: Positive for memory loss. Negative for depression, hallucinations, substance abuse and suicidal ideas. The patient is not nervous/anxious and does not have insomnia. Physical Exam 
Vitals signs and nursing note reviewed. Constitutional:   
   General: She is not in acute distress. Appearance: She is well-developed. She is not diaphoretic. HENT:  
   Head: Normocephalic and atraumatic. Mouth/Throat:  
   Pharynx: No oropharyngeal exudate. Eyes:  
   General: No scleral icterus. Right eye: No discharge. Left eye: No discharge. Conjunctiva/sclera: Conjunctivae normal.  
   Pupils: Pupils are equal, round, and reactive to light. Neck:  
   Thyroid: No thyromegaly. Cardiovascular:  
   Rate and Rhythm: Normal rate and regular rhythm. Heart sounds: Normal heart sounds. No murmur. Pulmonary:  
   Effort: Pulmonary effort is normal. No respiratory distress. Breath sounds: Normal breath sounds. No wheezing or rales. Chest:  
   Chest wall: No tenderness. Abdominal:  
   General: There is no distension. Palpations: Abdomen is soft. Tenderness: There is no abdominal tenderness. There is no rebound. Musculoskeletal:     
   General: Deformity present. No tenderness. Comments: Bilateral hand joint deformity Using walker Lymphadenopathy:  
   Cervical: No cervical adenopathy. Skin: 
   General: Skin is warm and dry. Coloration: Skin is not pale. Findings: No erythema or rash. Neurological:  
   Mental Status: She is alert and oriented to person, place, and time. Cranial Nerves: No cranial nerve deficit.   
   Coordination: Coordination normal.  
Psychiatric:     
 Behavior: Behavior normal.     
   Thought Content: Thought content normal.     
   Judgment: Judgment normal.  
 
  
 
Assessment and plan  
 
Plan of care has been discussed with the patient, he agrees to the plan and verbalized understanding. All his questions were answered More than 50% of the time spent in this visit was counseling the patient about  illness and treatment options 1. Essential hypertension Controlled continue losartan 50 mg daily - METABOLIC PANEL, COMPREHENSIVE; Future 2. Mixed hyperlipidemia She is not any medication currently - METABOLIC PANEL, COMPREHENSIVE; Future 3. Dementia without behavioral disturbance, unspecified dementia type ([address] It has been stable on any medications 4. Functional gait abnormality Patient using a walker currently will send home health for evaluation for home safety and further recommendation for her walker 
- [address] [place] [address] Current Outpatient Medications Medication Sig Dispense Refill  losartan (COZAAR) 50 mg tablet Take 50 mg by mouth daily. Patient Active Problem List  
 Diagnosis Date Noted  Essential hypertension 09/16/2019  Left hip pain 05/20/2019  
 History of left hip replacement 05/20/2019  Systolic hypertension 39/73/8780  Mixed hyperlipidemia 05/11/2019  Dementia without behavioral disturbance ([facility] [address] 05/11/2019 Results for orders placed or performed during the hospital encounter of 12/16/19 METABOLIC PANEL, COMPREHENSIVE Result Value Ref Range Sodium 135 (L) 136 - 145 mmol/L Potassium 4.1 3.5 - 5.5 mmol/L Chloride 104 100 - 111 mmol/L  
 CO2 25 21 - 32 mmol/L Anion gap 6 3.0 - 18 mmol/L Glucose 167 (H) 74 - 99 mg/dL BUN 15 7.0 - 18 MG/DL Creatinine 1.02 0.6 - 1.3 MG/DL  
 BUN/Creatinine ratio 15 12 - 20 GFR est AA >60 >60 ml/min/1.73m2 GFR est non-AA 50 (L) >60 ml/min/1.73m2 Calcium 9.0 8.5 - 10.1 MG/DL  Bilirubin, total 0.5 0.2 - 1.0 MG/DL  
 ALT (SGPT) 14 13 - 56 U/L  
 AST (SGOT) 17 10 - 38 U/L Alk. phosphatase 80 45 - 117 U/L Protein, total 7.0 6.4 - 8.2 g/dL Albumin 3.5 3.4 - 5.0 g/dL Globulin 3.5 2.0 - 4.0 g/dL A-G Ratio 1.0 0.8 - 1.7 No visits with results within 3 Month(s) from this visit. Latest known visit with results is:  
Hospital Outpatient Visit on 12/16/2019 Component Date Value Ref Range Status  Sodium 12/16/2019 135* 136 - 145 mmol/L Final  
 Potassium 12/16/2019 4.1  3.5 - 5.5 mmol/L Final  
 Chloride 12/16/2019 104  100 - 111 mmol/L Final  
 CO2 12/16/2019 25  21 - 32 mmol/L Final  
 Anion gap 12/16/2019 6  3.0 - 18 mmol/L Final  
 Glucose 12/16/2019 167* 74 - 99 mg/dL Final  
 BUN 12/16/2019 15  7.0 - 18 MG/DL Final  
 Creatinine 12/16/2019 1.02  0.6 - 1.3 MG/DL Final  
 BUN/Creatinine ratio 12/16/2019 15  12 - 20   Final  
 GFR est AA 12/16/2019 >60  >60 ml/min/1.73m2 Final  
 GFR est non-AA 12/16/2019 50* >60 ml/min/1.73m2 Final  
 Comment: (NOTE) Estimated GFR is calculated using the Modification of Diet in Renal  
Disease (MDRD) Study equation, reported for both  Americans [facility]) and non- Americans (GFRNA), and normalized to 1.73m2  
body surface area. The physician must decide which value applies to  
the patient. The MDRD study equation should only be used in  
individuals age 25 or older. It has not been validated for the  
following: pregnant women, patients with serious comorbid conditions,  
or on certain medications, or persons with extremes of body size,  
muscle mass, or nutritional status.  Calcium 12/16/2019 9.0  8.5 - 10.1 MG/DL Final  
 Bilirubin, total 12/16/2019 0.5  0.2 - 1.0 MG/DL Final  
 ALT (SGPT) 12/16/2019 14  13 - 56 U/L Final  
 AST (SGOT) 12/16/2019 17  10 - 38 U/L Final  
 Alk.  phosphatase 12/16/2019 80  45 - 117 U/L Final  
 Protein, total 12/16/2019 7.0  6.4 - 8.2 g/dL Final  
 Albumin 12/16/2019 3.5  3.4 - 5.0 g/dL Final  
  Globulin 12/16/2019 3.5  2.0 - 4.0 g/dL Final  
 A-G Ratio 12/16/2019 1.0  0.8 - 1.7   Final  
  
 
 
Follow-up and Dispositions · Return in about 3 months (around 6/16/2020).

## 2020-03-16 NOTE — PROGRESS NOTES
Jennifer Goldman presents today for Chief Complaint Patient presents with  Hypertension  Cholesterol Problem Is someone accompanying this pt? yes Is the patient using any DME equipment during OV? yes Depression Screening: 
3 most recent PHQ Screens 9/16/2019 Little interest or pleasure in doing things Not at all Feeling down, depressed, irritable, or hopeless Not at all Total Score PHQ 2 0 Learning Assessment: 
Learning Assessment 9/16/2019 PRIMARY LEARNER Patient PRIMARY LANGUAGE ENGLISH  
LEARNER PREFERENCE PRIMARY VIDEOS  
  PICTURES  
  DEMONSTRATION  
ANSWERED BY patient RELATIONSHIP SELF Abuse Screening: 
Abuse Screening Questionnaire 9/16/2019 Do you ever feel afraid of your partner? Carmen Jimenez Are you in a relationship with someone who physically or mentally threatens you? Carmen Jimenez Is it safe for you to go home? Tam Fischer Fall Risk Fall Risk Assessment, last 12 mths 9/16/2019 Able to walk? Yes Fall in past 12 months? No  
Fall with injury? -  
Number of falls in past 12 months - Fall Risk Score - Health Maintenance reviewed and discussed and ordered per Provider. Health Maintenance Due Topic Date Due  
 DTaP/Tdap/Td series (1 - Tdap) 11/13/1942  Shingrix Vaccine Age 50> (1 of 2) 11/13/1971  GLAUCOMA SCREENING Q2Y  11/13/1986 Pt currently taking Antiplatelet therapy? no 
 
Coordination of Care: 1. Have you been to the ER, urgent care clinic since your last visit? Hospitalized since your last visit? no 
 
2. Have you seen or consulted any other health care providers outside of the [address] [place] [address] since your last visit? Include any pap smears or colon screening.  no

## 2020-03-17 ENCOUNTER — HOME CARE VISIT (OUTPATIENT)
Dept: SCHEDULING | Facility: HOME HEALTH | Age: 85
End: 2020-03-17
Payer: MEDICARE

## 2020-03-17 ENCOUNTER — HOME CARE VISIT (OUTPATIENT)
Dept: HOME HEALTH SERVICES | Facility: HOME HEALTH | Age: 85
End: 2020-03-17

## 2020-03-17 VITALS
OXYGEN SATURATION: 97 % | SYSTOLIC BLOOD PRESSURE: 122 MMHG | DIASTOLIC BLOOD PRESSURE: 80 MMHG | HEART RATE: 82 BPM | TEMPERATURE: 98 F | RESPIRATION RATE: 16 BRPM

## 2020-03-17 PROCEDURE — 400013 HH SOC

## 2020-03-17 PROCEDURE — 3331090001 HH PPS REVENUE CREDIT

## 2020-03-17 PROCEDURE — G0151 HHCP-SERV OF PT,EA 15 MIN: HCPCS

## 2020-03-17 PROCEDURE — 3331090002 HH PPS REVENUE DEBIT

## 2020-03-18 ENCOUNTER — HOME CARE VISIT (OUTPATIENT)
Dept: HOME HEALTH SERVICES | Facility: HOME HEALTH | Age: 85
End: 2020-03-18
Payer: MEDICARE

## 2020-03-18 PROCEDURE — 3331090001 HH PPS REVENUE CREDIT

## 2020-03-18 PROCEDURE — 3331090002 HH PPS REVENUE DEBIT

## 2020-03-19 ENCOUNTER — HOME CARE VISIT (OUTPATIENT)
Dept: SCHEDULING | Facility: HOME HEALTH | Age: 85
End: 2020-03-19
Payer: MEDICARE

## 2020-03-19 PROCEDURE — 3331090001 HH PPS REVENUE CREDIT

## 2020-03-19 PROCEDURE — G0152 HHCP-SERV OF OT,EA 15 MIN: HCPCS

## 2020-03-19 PROCEDURE — 3331090002 HH PPS REVENUE DEBIT

## 2020-03-19 PROCEDURE — G0157 HHC PT ASSISTANT EA 15: HCPCS

## 2020-03-20 ENCOUNTER — HOME CARE VISIT (OUTPATIENT)
Dept: SCHEDULING | Facility: HOME HEALTH | Age: 85
End: 2020-03-20
Payer: MEDICARE

## 2020-03-20 VITALS
RESPIRATION RATE: 18 BRPM | OXYGEN SATURATION: 98 % | HEART RATE: 76 BPM | DIASTOLIC BLOOD PRESSURE: 82 MMHG | TEMPERATURE: 97.3 F | SYSTOLIC BLOOD PRESSURE: 144 MMHG

## 2020-03-20 VITALS
HEART RATE: 83 BPM | TEMPERATURE: 98.6 F | OXYGEN SATURATION: 98 % | SYSTOLIC BLOOD PRESSURE: 124 MMHG | DIASTOLIC BLOOD PRESSURE: 62 MMHG

## 2020-03-20 PROCEDURE — 3331090002 HH PPS REVENUE DEBIT

## 2020-03-20 PROCEDURE — 3331090001 HH PPS REVENUE CREDIT

## 2020-03-20 PROCEDURE — G0158 HHC OT ASSISTANT EA 15: HCPCS

## 2020-03-21 PROCEDURE — 3331090001 HH PPS REVENUE CREDIT

## 2020-03-21 PROCEDURE — 3331090002 HH PPS REVENUE DEBIT

## 2020-03-22 PROCEDURE — 3331090001 HH PPS REVENUE CREDIT

## 2020-03-22 PROCEDURE — 3331090002 HH PPS REVENUE DEBIT

## 2020-03-23 ENCOUNTER — HOME CARE VISIT (OUTPATIENT)
Dept: SCHEDULING | Facility: HOME HEALTH | Age: 85
End: 2020-03-23
Payer: MEDICARE

## 2020-03-23 VITALS
TEMPERATURE: 97.3 F | OXYGEN SATURATION: 99 % | HEART RATE: 71 BPM | SYSTOLIC BLOOD PRESSURE: 132 MMHG | DIASTOLIC BLOOD PRESSURE: 80 MMHG

## 2020-03-23 PROCEDURE — 3331090002 HH PPS REVENUE DEBIT

## 2020-03-23 PROCEDURE — G0158 HHC OT ASSISTANT EA 15: HCPCS

## 2020-03-23 PROCEDURE — 3331090001 HH PPS REVENUE CREDIT

## 2020-03-24 ENCOUNTER — HOME CARE VISIT (OUTPATIENT)
Dept: SCHEDULING | Facility: HOME HEALTH | Age: 85
End: 2020-03-24
Payer: MEDICARE

## 2020-03-24 PROCEDURE — 3331090002 HH PPS REVENUE DEBIT

## 2020-03-24 PROCEDURE — G0157 HHC PT ASSISTANT EA 15: HCPCS

## 2020-03-24 PROCEDURE — 3331090001 HH PPS REVENUE CREDIT

## 2020-03-25 ENCOUNTER — HOME CARE VISIT (OUTPATIENT)
Dept: SCHEDULING | Facility: HOME HEALTH | Age: 85
End: 2020-03-25
Payer: MEDICARE

## 2020-03-25 VITALS
SYSTOLIC BLOOD PRESSURE: 124 MMHG | HEART RATE: 67 BPM | OXYGEN SATURATION: 99 % | RESPIRATION RATE: 18 BRPM | TEMPERATURE: 97.4 F | DIASTOLIC BLOOD PRESSURE: 66 MMHG

## 2020-03-25 PROCEDURE — 3331090001 HH PPS REVENUE CREDIT

## 2020-03-25 PROCEDURE — 3331090002 HH PPS REVENUE DEBIT

## 2020-03-25 PROCEDURE — G0158 HHC OT ASSISTANT EA 15: HCPCS

## 2020-03-26 ENCOUNTER — HOME CARE VISIT (OUTPATIENT)
Dept: SCHEDULING | Facility: HOME HEALTH | Age: 85
End: 2020-03-26
Payer: MEDICARE

## 2020-03-26 PROCEDURE — 3331090001 HH PPS REVENUE CREDIT

## 2020-03-26 PROCEDURE — G0157 HHC PT ASSISTANT EA 15: HCPCS

## 2020-03-26 PROCEDURE — 3331090002 HH PPS REVENUE DEBIT

## 2020-03-27 PROCEDURE — 3331090001 HH PPS REVENUE CREDIT

## 2020-03-27 PROCEDURE — 3331090002 HH PPS REVENUE DEBIT

## 2020-03-28 PROCEDURE — 3331090001 HH PPS REVENUE CREDIT

## 2020-03-28 PROCEDURE — 3331090002 HH PPS REVENUE DEBIT

## 2020-03-29 PROCEDURE — 3331090001 HH PPS REVENUE CREDIT

## 2020-03-29 PROCEDURE — 3331090002 HH PPS REVENUE DEBIT

## 2020-03-30 PROCEDURE — 3331090001 HH PPS REVENUE CREDIT

## 2020-03-30 PROCEDURE — 3331090002 HH PPS REVENUE DEBIT

## 2020-03-31 ENCOUNTER — HOME CARE VISIT (OUTPATIENT)
Dept: SCHEDULING | Facility: HOME HEALTH | Age: 85
End: 2020-03-31
Payer: MEDICARE

## 2020-03-31 VITALS
SYSTOLIC BLOOD PRESSURE: 128 MMHG | TEMPERATURE: 97.8 F | HEART RATE: 68 BPM | DIASTOLIC BLOOD PRESSURE: 64 MMHG | OXYGEN SATURATION: 98 % | RESPIRATION RATE: 16 BRPM

## 2020-03-31 PROCEDURE — G0157 HHC PT ASSISTANT EA 15: HCPCS

## 2020-03-31 PROCEDURE — 3331090001 HH PPS REVENUE CREDIT

## 2020-03-31 PROCEDURE — 3331090002 HH PPS REVENUE DEBIT

## 2020-03-31 PROCEDURE — G0158 HHC OT ASSISTANT EA 15: HCPCS

## 2020-04-01 PROCEDURE — 3331090002 HH PPS REVENUE DEBIT

## 2020-04-01 PROCEDURE — 3331090001 HH PPS REVENUE CREDIT

## 2020-04-02 ENCOUNTER — HOME CARE VISIT (OUTPATIENT)
Dept: SCHEDULING | Facility: HOME HEALTH | Age: 85
End: 2020-04-02
Payer: MEDICARE

## 2020-04-02 PROCEDURE — 3331090001 HH PPS REVENUE CREDIT

## 2020-04-02 PROCEDURE — 3331090002 HH PPS REVENUE DEBIT

## 2020-04-02 PROCEDURE — G0157 HHC PT ASSISTANT EA 15: HCPCS

## 2020-04-03 PROCEDURE — 3331090001 HH PPS REVENUE CREDIT

## 2020-04-03 PROCEDURE — 3331090002 HH PPS REVENUE DEBIT

## 2020-04-04 PROCEDURE — 3331090001 HH PPS REVENUE CREDIT

## 2020-04-04 PROCEDURE — 3331090002 HH PPS REVENUE DEBIT

## 2020-04-05 PROCEDURE — 3331090001 HH PPS REVENUE CREDIT

## 2020-04-05 PROCEDURE — 3331090002 HH PPS REVENUE DEBIT

## 2020-04-06 ENCOUNTER — HOME CARE VISIT (OUTPATIENT)
Dept: SCHEDULING | Facility: HOME HEALTH | Age: 85
End: 2020-04-06
Payer: MEDICARE

## 2020-04-06 VITALS
DIASTOLIC BLOOD PRESSURE: 60 MMHG | DIASTOLIC BLOOD PRESSURE: 70 MMHG | DIASTOLIC BLOOD PRESSURE: 78 MMHG | HEART RATE: 75 BPM | HEART RATE: 67 BPM | SYSTOLIC BLOOD PRESSURE: 118 MMHG | SYSTOLIC BLOOD PRESSURE: 120 MMHG | OXYGEN SATURATION: 99 % | TEMPERATURE: 97.5 F | OXYGEN SATURATION: 98 % | RESPIRATION RATE: 16 BRPM | TEMPERATURE: 98.1 F | HEART RATE: 79 BPM | HEART RATE: 50 BPM | SYSTOLIC BLOOD PRESSURE: 120 MMHG | DIASTOLIC BLOOD PRESSURE: 70 MMHG | OXYGEN SATURATION: 97 % | HEART RATE: 67 BPM | TEMPERATURE: 97.6 F | TEMPERATURE: 98 F | DIASTOLIC BLOOD PRESSURE: 80 MMHG | RESPIRATION RATE: 16 BRPM | TEMPERATURE: 97.6 F | OXYGEN SATURATION: 98 % | SYSTOLIC BLOOD PRESSURE: 126 MMHG | RESPIRATION RATE: 16 BRPM | SYSTOLIC BLOOD PRESSURE: 110 MMHG | OXYGEN SATURATION: 97 %

## 2020-04-06 VITALS
SYSTOLIC BLOOD PRESSURE: 124 MMHG | DIASTOLIC BLOOD PRESSURE: 82 MMHG | OXYGEN SATURATION: 95 % | HEART RATE: 55 BPM | TEMPERATURE: 98.5 F

## 2020-04-06 PROCEDURE — 3331090002 HH PPS REVENUE DEBIT

## 2020-04-06 PROCEDURE — G0151 HHCP-SERV OF PT,EA 15 MIN: HCPCS

## 2020-04-06 PROCEDURE — 3331090001 HH PPS REVENUE CREDIT

## 2020-04-07 ENCOUNTER — HOME CARE VISIT (OUTPATIENT)
Dept: SCHEDULING | Facility: HOME HEALTH | Age: 85
End: 2020-04-07
Payer: MEDICARE

## 2020-04-07 VITALS — DIASTOLIC BLOOD PRESSURE: 74 MMHG | TEMPERATURE: 97.4 F | SYSTOLIC BLOOD PRESSURE: 126 MMHG

## 2020-04-07 PROCEDURE — G0152 HHCP-SERV OF OT,EA 15 MIN: HCPCS

## 2020-04-07 PROCEDURE — 3331090002 HH PPS REVENUE DEBIT

## 2020-04-07 PROCEDURE — 3331090001 HH PPS REVENUE CREDIT

## 2020-05-15 ENCOUNTER — VIRTUAL VISIT (OUTPATIENT)
Dept: FAMILY MEDICINE CLINIC | Age: 85
End: 2020-05-15

## 2020-05-21 RX ORDER — LOSARTAN POTASSIUM 50 MG/1
TABLET ORAL
Qty: 135 TAB | Refills: 0 | Status: SHIPPED | OUTPATIENT
Start: 2020-05-21 | End: 2020-07-12

## 2020-05-26 ENCOUNTER — OFFICE VISIT (OUTPATIENT)
Dept: FAMILY MEDICINE CLINIC | Age: 85
End: 2020-05-26

## 2020-05-26 ENCOUNTER — HOSPITAL ENCOUNTER (OUTPATIENT)
Dept: LAB | Age: 85
Discharge: HOME | End: 2020-05-26
Payer: MEDICARE

## 2020-05-26 VITALS
HEIGHT: 59 IN | OXYGEN SATURATION: 96 % | BODY MASS INDEX: 28.91 KG/M2 | WEIGHT: 143.4 LBS | TEMPERATURE: 98 F | RESPIRATION RATE: 14 BRPM | HEART RATE: 69 BPM | SYSTOLIC BLOOD PRESSURE: 138 MMHG | DIASTOLIC BLOOD PRESSURE: 76 MMHG

## 2020-05-26 DIAGNOSIS — H92.02 LEFT EAR PAIN: ICD-10-CM

## 2020-05-26 DIAGNOSIS — I10 ESSENTIAL HYPERTENSION: ICD-10-CM

## 2020-05-26 DIAGNOSIS — F03.90 DEMENTIA WITHOUT BEHAVIORAL DISTURBANCE, UNSPECIFIED DEMENTIA TYPE: ICD-10-CM

## 2020-05-26 DIAGNOSIS — R10.10 PAIN OF UPPER ABDOMEN: ICD-10-CM

## 2020-05-26 DIAGNOSIS — H60.502 ACUTE OTITIS EXTERNA OF LEFT EAR, UNSPECIFIED TYPE: Primary | ICD-10-CM

## 2020-05-26 DIAGNOSIS — R26.89 FUNCTIONAL GAIT ABNORMALITY: ICD-10-CM

## 2020-05-26 LAB
ALBUMIN SERPL-MCNC: 3.4 G/DL (ref 3.4–5)
ALBUMIN/GLOB SERPL: 0.9 {RATIO} (ref 0.8–1.7)
ALP SERPL-CCNC: 82 U/L (ref 45–117)
ALT SERPL-CCNC: 16 U/L (ref 13–56)
ANION GAP SERPL CALC-SCNC: 6 MMOL/L (ref 3–18)
AST SERPL-CCNC: 16 U/L (ref 10–38)
BASOPHILS # BLD: 0 K/UL (ref 0–0.1)
BASOPHILS NFR BLD: 0 % (ref 0–2)
BILIRUB SERPL-MCNC: 0.3 MG/DL (ref 0.2–1)
BUN SERPL-MCNC: 15 MG/DL (ref 7–18)
BUN/CREAT SERPL: 16 (ref 12–20)
CALCIUM SERPL-MCNC: 8.4 MG/DL (ref 8.5–10.1)
CHLORIDE SERPL-SCNC: 106 MMOL/L (ref 100–111)
CO2 SERPL-SCNC: 26 MMOL/L (ref 21–32)
CREAT SERPL-MCNC: 0.94 MG/DL (ref 0.6–1.3)
DIFFERENTIAL METHOD BLD: ABNORMAL
EOSINOPHIL # BLD: 0.1 K/UL (ref 0–0.4)
EOSINOPHIL NFR BLD: 1 % (ref 0–5)
ERYTHROCYTE [DISTWIDTH] IN BLOOD BY AUTOMATED COUNT: 14 % (ref 11.6–14.5)
GLOBULIN SER CALC-MCNC: 3.7 G/DL (ref 2–4)
GLUCOSE SERPL-MCNC: 85 MG/DL (ref 74–99)
HCT VFR BLD AUTO: 35.9 % (ref 35–45)
HGB BLD-MCNC: 11.7 G/DL (ref 12–16)
LYMPHOCYTES # BLD: 1.5 K/UL (ref 0.9–3.6)
LYMPHOCYTES NFR BLD: 25 % (ref 21–52)
MCH RBC QN AUTO: 29.6 PG (ref 24–34)
MCHC RBC AUTO-ENTMCNC: 32.6 G/DL (ref 31–37)
MCV RBC AUTO: 90.9 FL (ref 74–97)
MONOCYTES # BLD: 0.4 K/UL (ref 0.05–1.2)
MONOCYTES NFR BLD: 7 % (ref 3–10)
NEUTS SEG # BLD: 4.1 K/UL (ref 1.8–8)
NEUTS SEG NFR BLD: 67 % (ref 40–73)
PLATELET # BLD AUTO: 294 K/UL (ref 135–420)
PMV BLD AUTO: 11.2 FL (ref 9.2–11.8)
POTASSIUM SERPL-SCNC: 4.5 MMOL/L (ref 3.5–5.5)
PROT SERPL-MCNC: 7.1 G/DL (ref 6.4–8.2)
RBC # BLD AUTO: 3.95 M/UL (ref 4.2–5.3)
SODIUM SERPL-SCNC: 138 MMOL/L (ref 136–145)
WBC # BLD AUTO: 6.1 K/UL (ref 4.6–13.2)

## 2020-05-26 PROCEDURE — 36415 COLL VENOUS BLD VENIPUNCTURE: CPT

## 2020-05-26 PROCEDURE — 85025 COMPLETE CBC W/AUTO DIFF WBC: CPT

## 2020-05-26 PROCEDURE — 80053 COMPREHEN METABOLIC PANEL: CPT

## 2020-05-26 RX ORDER — CIPROFLOXACIN AND DEXAMETHASONE 3; 1 MG/ML; MG/ML
4 SUSPENSION/ DROPS AURICULAR (OTIC) 2 TIMES DAILY
Qty: 7.5 ML | Refills: 0 | Status: SHIPPED | OUTPATIENT
Start: 2020-05-26 | End: 2020-06-02

## 2020-05-26 NOTE — PROGRESS NOTES
Aman Au is a 80 y.o. female (: 1921) presenting to address: Chief Complaint Patient presents with  Ear Pain  
  hearing aid was stuck in left ear Vitals:  
 20 1254 BP: 138/76 Pulse: 69 Resp: 14 Temp: 98 °F (36.7 °C) TempSrc: Oral  
SpO2: 96% Weight: 143 lb 6.4 oz (65 kg) Height: 4' 11\" (1.499 m) PainSc:   0 - No pain Hearing/Vision: No exam data present Learning Assessment:  
 
Learning Assessment 2019 PRIMARY LEARNER Patient PRIMARY LANGUAGE ENGLISH  
LEARNER PREFERENCE PRIMARY VIDEOS  
  PICTURES  
  DEMONSTRATION  
ANSWERED BY patient RELATIONSHIP SELF Depression Screening:  
 
3 most recent PHQ Screens 2020 Little interest or pleasure in doing things Not at all Feeling down, depressed, irritable, or hopeless Not at all Total Score PHQ 2 0 Fall Risk Assessment:  
 
Fall Risk Assessment, last 12 mths 2020 Able to walk? Yes Fall in past 12 months? No  
Fall with injury? -  
Number of falls in past 12 months - Fall Risk Score -  
 
Abuse Screening:  
 
Abuse Screening Questionnaire 2019 Do you ever feel afraid of your partner? Lenard Horde Are you in a relationship with someone who physically or mentally threatens you? Lenard Horde Is it safe for you to go home? Michael Garland Coordination of Care Questionaire: 1. Have you been to the ER, urgent care clinic since your last visit? Hospitalized since your last visit? NO 
 
2. Have you seen or consulted any other health care providers outside of the [address] [place] [address] since your last visit? Include any pap smears or colon screening. YES, [facility] pulled out battery in her ear Advanced Directive: 1. Do you have an Advanced Directive? NO 
 
2. Would you like information on Advanced Directives?  NO

## 2020-05-26 NOTE — PROGRESS NOTES
Bubba Delaney Chief Complaint Patient presents with  Ear Pain  
  hearing aid was stuck in left ear Vitals:  
 05/26/20 1254 BP: 138/76 Pulse: 69 Resp: 14 Temp: 98 °F (36.7 °C) TempSrc: Oral  
SpO2: 96% Weight: 143 lb 6.4 oz (65 kg) Height: 4' 11\" (1.499 m) PainSc:   0 - No pain HPI she is here for left ear pain patient has dementia and memory loss and not a very good historian, she is here today with her caregiver watches her niece Patient does wear hearing aid and went for hearing aid checkup and they found the battery on her, as per her caregiver she simply found some erosion in the ear canal, patient had no discharge but she complained about ear pain in the last few days, the battery was taken out about 8 days ago, but they are not sure how long it is been inside her ear. Patient has no fever no chills no nausea no vomiting. She has been complaining about occasional heartburn and indigestion. No change in bowel habits Past Medical History:  
Diagnosis Date  Arthritis  Hypertension Past Surgical History:  
Procedure Laterality Date  HX HIP REPLACEMENT Social History Tobacco Use  Smoking status: Never Smoker  Smokeless tobacco: Never Used Substance Use Topics  Alcohol use: Not Currently Family History Problem Relation Age of Onset  No Known Problems Mother  No Known Problems Father Review of Systems Constitutional: Negative for chills, fever, malaise/fatigue and weight loss. HENT: Positive for ear pain. Negative for congestion, ear discharge, hearing loss and nosebleeds. Eyes: Negative for blurred vision, double vision and discharge. Respiratory: Negative for cough. Cardiovascular: Negative for chest pain, palpitations, claudication and leg swelling. Gastrointestinal: Negative for abdominal pain, constipation, diarrhea, nausea and vomiting. Genitourinary: Negative for dysuria, frequency and urgency. Musculoskeletal: Negative for myalgias. Skin: Negative for itching and rash. Neurological: Negative for dizziness, tingling, sensory change, speech change, focal weakness, weakness and headaches. Psychiatric/Behavioral: Positive for memory loss. Negative for depression and suicidal ideas. Physical Exam 
Constitutional:   
   General: She is not in acute distress. Appearance: She is well-developed. She is not diaphoretic. HENT:  
   Head: Normocephalic and atraumatic. Right Ear: Tympanic membrane, ear canal and external ear normal. There is no impacted cerumen. Left Ear: Tympanic membrane normal. There is no impacted cerumen. Ears:  
   Comments: Left ear canal has what looks like may be dried blood or wax, there is no pus no discharge, and there is  ? ? Mild  tenderness in the periauricular lymph nodes Eyes:  
   General: No scleral icterus. Right eye: No discharge. Left eye: No discharge. Conjunctiva/sclera: Conjunctivae normal.  
   Pupils: Pupils are equal, round, and reactive to light. Neck:  
   Thyroid: No thyromegaly. Cardiovascular:  
   Rate and Rhythm: Normal rate and regular rhythm. Heart sounds: Normal heart sounds. No murmur. Pulmonary:  
   Effort: Pulmonary effort is normal. No respiratory distress. Breath sounds: Normal breath sounds. No wheezing or rales. Chest:  
   Chest wall: No tenderness. Abdominal:  
   General: There is no distension. Palpations: Abdomen is soft. Tenderness: There is no abdominal tenderness. There is no rebound. Musculoskeletal: Normal range of motion. General: No tenderness or deformity. Lymphadenopathy:  
   Cervical: No cervical adenopathy. Skin: 
   General: Skin is warm and dry. Coloration: Skin is not pale. Findings: No erythema or rash. Neurological: Mental Status: She is alert and oriented to person, place, and time. Cranial Nerves: No cranial nerve deficit. Coordination: Coordination normal.  
Psychiatric:     
   Behavior: Behavior normal.  
   Comments: She has dementia Assessment and plan  
 
Plan of care has been discussed with the patient, he agrees to the plan and verbalized understanding. All his questions were answered More than 50% of the time spent in this visit was counseling the patient about  illness and treatment options 1. Essential hypertension Well controlled - METABOLIC PANEL, COMPREHENSIVE; Future 2. Dementia without behavioral disturbance, unspecified dementia type ([address] Stable 3. Functional gait abnormality 
 patient is using walker 4. Left ear pain 5. Acute otitis externa of left ear, unspecified type 
 do not wear hearing aid for 10 day  
- ciprofloxacin-dexamethasone (CIPRODEX) 0.3-0.1 % otic suspension; Administer 4 Drops in left ear two (2) times a day for 7 days. Dispense: 7.5 mL; Refill: 0 
 
6. Pain of upper abdomen Advised caregiver to give Tums as needed 
 
 
- CBC WITH AUTOMATED DIFF; Future Current Outpatient Medications Medication Sig Dispense Refill  ciprofloxacin-dexamethasone (CIPRODEX) 0.3-0.1 % otic suspension Administer 4 Drops in left ear two (2) times a day for 7 days. 7.5 mL 0  
 losartan (COZAAR) 50 mg tablet TAKE 1 1/2 TABLETS BY MOUTH EVERY DAY (Patient taking differently: Patient is only taking 50 mg tab, she is not taking the half tab with the one 50 mg tab) 135 Tab 0  
 acetaminophen (TYLENOL ARTHRITIS PO) Take 500 mg by mouth as needed for Cough, Diarrhea, Fever, Nausea or Pain.  diphenhydrAMINE-acetaminophen (Tylenol PM Extra Strength)  mg tab Take 500 mg by mouth daily as needed for Cough, Diarrhea, Fever, Other or Pain. Patient Active Problem List  
 Diagnosis Date Noted  Essential hypertension 09/16/2019  Left hip pain 05/20/2019  
 History of left hip replacement 05/20/2019  Systolic hypertension 28/27/9000  Mixed hyperlipidemia 05/11/2019  Dementia without behavioral disturbance ([facility] [address] 05/11/2019 Results for orders placed or performed during the hospital encounter of 03/16/20 METABOLIC PANEL, COMPREHENSIVE Result Value Ref Range Sodium 140 136 - 145 mmol/L Potassium 4.4 3.5 - 5.5 mmol/L Chloride 107 100 - 111 mmol/L  
 CO2 23 21 - 32 mmol/L Anion gap 10 3.0 - 18 mmol/L Glucose 112 (H) 74 - 99 mg/dL BUN 16 7.0 - 18 MG/DL Creatinine 0.91 0.6 - 1.3 MG/DL  
 BUN/Creatinine ratio 18 12 - 20 GFR est AA >60 >60 ml/min/1.73m2 GFR est non-AA 57 (L) >60 ml/min/1.73m2 Calcium 8.8 8.5 - 10.1 MG/DL Bilirubin, total 0.5 0.2 - 1.0 MG/DL  
 ALT (SGPT) 16 13 - 56 U/L  
 AST (SGOT) 16 10 - 38 U/L Alk. phosphatase 78 45 - 117 U/L Protein, total 7.1 6.4 - 8.2 g/dL Albumin 3.3 (L) 3.4 - 5.0 g/dL Globulin 3.8 2.0 - 4.0 g/dL A-G Ratio 0.9 0.8 - 1.7 Hospital Outpatient Visit on 03/16/2020 Component Date Value Ref Range Status  Sodium 03/16/2020 140  136 - 145 mmol/L Final  
 Potassium 03/16/2020 4.4  3.5 - 5.5 mmol/L Final  
 Chloride 03/16/2020 107  100 - 111 mmol/L Final  
 CO2 03/16/2020 23  21 - 32 mmol/L Final  
 Anion gap 03/16/2020 10  3.0 - 18 mmol/L Final  
 Glucose 03/16/2020 112* 74 - 99 mg/dL Final  
 BUN 03/16/2020 16  7.0 - 18 MG/DL Final  
 Creatinine 03/16/2020 0.91  0.6 - 1.3 MG/DL Final  
 BUN/Creatinine ratio 03/16/2020 18  12 - 20   Final  
 GFR est AA 03/16/2020 >60  >60 ml/min/1.73m2 Final  
 GFR est non-AA 03/16/2020 57* >60 ml/min/1.73m2 Final  
 Comment: (NOTE) Estimated GFR is calculated using the Modification of Diet in Renal  
Disease (MDRD) Study equation, reported for both  Americans (GFRAA) and non- Americans (GFRNA), and normalized to 1.73m2 body surface area. The physician must decide which value applies to  
the patient. The MDRD study equation should only be used in  
individuals age 25 or older. It has not been validated for the  
following: pregnant women, patients with serious comorbid conditions,  
or on certain medications, or persons with extremes of body size,  
muscle mass, or nutritional status.  Calcium 03/16/2020 8.8  8.5 - 10.1 MG/DL Final  
 Bilirubin, total 03/16/2020 0.5  0.2 - 1.0 MG/DL Final  
 ALT (SGPT) 03/16/2020 16  13 - 56 U/L Final  
 AST (SGOT) 03/16/2020 16  10 - 38 U/L Final  
 Alk. phosphatase 03/16/2020 78  45 - 117 U/L Final  
 Protein, total 03/16/2020 7.1  6.4 - 8.2 g/dL Final  
 Albumin 03/16/2020 3.3* 3.4 - 5.0 g/dL Final  
 Globulin 03/16/2020 3.8  2.0 - 4.0 g/dL Final  
 A-G Ratio 03/16/2020 0.9  0.8 - 1.7   Final  
  
 
 
Follow-up and Dispositions · Return if symptoms worsen or fail to improve.

## 2020-06-15 ENCOUNTER — TELEPHONE (OUTPATIENT)
Dept: FAMILY MEDICINE CLINIC | Age: 85
End: 2020-06-15

## 2020-06-15 ENCOUNTER — VIRTUAL VISIT (OUTPATIENT)
Dept: FAMILY MEDICINE CLINIC | Age: 85
End: 2020-06-15

## 2020-06-15 DIAGNOSIS — F01.50 VASCULAR DEMENTIA WITHOUT BEHAVIORAL DISTURBANCE (HCC): Primary | ICD-10-CM

## 2020-06-15 DIAGNOSIS — I10 ESSENTIAL HYPERTENSION: ICD-10-CM

## 2020-06-15 DIAGNOSIS — R26.89 FUNCTIONAL GAIT ABNORMALITY: ICD-10-CM

## 2020-06-15 NOTE — TELEPHONE ENCOUNTER
Please get records from     [facility]  [facility]

## 2020-06-15 NOTE — PROGRESS NOTES
Maeve Sanchez is a 80 y.o. female who was seen by synchronous (real-time) audio-video technology on 6/15/2020. Consent: Maeve Sanchez, who was seen by synchronous (real-time) audio-video technology, and/or her healthcare decision maker, is aware that this patient-initiated, Telehealth encounter on 6/15/2020 is a billable service, with coverage as determined by her insurance carrier. She is aware that she may receive a bill and has provided verbal consent to proceed: Yes. This service was provided through (Telehealth, Virtual check in [email or web address]. me) patient  was at home provider Rachel Can was at [place] [address] Patient was done with the patient and her caregiver Patient due to dementia There is no acute changes, caregiver stating that patient Is always in her bed, and occasionally she seated in front of TV, appetite is fair, but she is not drinking lots of water, she drink coffee and tea, strongly advised her to take sips of water throughout the day if she cannot drink alcohol, at that time. No recent falls, patient has been walking with his walker Has seen eye doctor [facility]  [phone] Assessment & Plan:  
Diagnoses and all orders for this visit: 1. Vascular dementia without behavioral disturbance ([facility] [address] She will be following up with neurologist 
 
 
2. Essential hypertension Is well controlled not losartan 50 mg daily 3. Functional gait abnormality Patient is doing well is a walker I Subjective:  
Maeve Sanchez is a 80 y.o. female who was seen for Hypertension and Follow-up Prior to Admission medications Medication Sig Start Date End Date Taking? Authorizing Provider  
losartan (COZAAR) 50 mg tablet TAKE 1 1/2 TABLETS BY MOUTH EVERY DAY Patient taking differently: Patient is only taking 50 mg tab, she is not taking the half tab with the one 50 mg tab 5/21/20  Yes Billy Guillen MD  
 acetaminophen (TYLENOL ARTHRITIS PO) Take 500 mg by mouth as needed for Cough, Diarrhea, Fever, Nausea or Pain. Yes Provider, Historical  
diphenhydrAMINE-acetaminophen (Tylenol PM Extra Strength)  mg tab Take 500 mg by mouth daily as needed for Cough, Diarrhea, Fever, Other or Pain. Yes Provider, Historical  
 
No Known Allergies Patient Active Problem List  
Diagnosis Code  Mixed hyperlipidemia E78.2  Dementia without behavioral disturbance ([facility]) F03.90  Left hip pain M25.552  
 History of left hip replacement Z96.642  Systolic hypertension A92  
 Essential hypertension I10 Patient Active Problem List  
 Diagnosis Date Noted  Essential hypertension 09/16/2019  Left hip pain 05/20/2019  
 History of left hip replacement 05/20/2019  Systolic hypertension 40/43/9659  Mixed hyperlipidemia 05/11/2019  Dementia without behavioral disturbance ([facility] [address] 05/11/2019 Current Outpatient Medications Medication Sig Dispense Refill  losartan (COZAAR) 50 mg tablet TAKE 1 1/2 TABLETS BY MOUTH EVERY DAY (Patient taking differently: Patient is only taking 50 mg tab, she is not taking the half tab with the one 50 mg tab) 135 Tab 0  
 acetaminophen (TYLENOL ARTHRITIS PO) Take 500 mg by mouth as needed for Cough, Diarrhea, Fever, Nausea or Pain.  diphenhydrAMINE-acetaminophen (Tylenol PM Extra Strength)  mg tab Take 500 mg by mouth daily as needed for Cough, Diarrhea, Fever, Other or Pain. No Known Allergies Past Medical History:  
Diagnosis Date  Arthritis  Hypertension Past Surgical History:  
Procedure Laterality Date  HX HIP REPLACEMENT Family History Problem Relation Age of Onset  No Known Problems Mother  No Known Problems Father Social History Tobacco Use  Smoking status: Never Smoker  Smokeless tobacco: Never Used Substance Use Topics  Alcohol use: Not Currently Review of Systems Constitutional: Negative for chills, fever, malaise/fatigue and weight loss. HENT: Negative for congestion, ear discharge, ear pain, hearing loss, nosebleeds, sinus pain and sore throat. Eyes: Negative for blurred vision, double vision and discharge. Respiratory: Negative for cough, hemoptysis, sputum production, shortness of breath and wheezing. Cardiovascular: Negative for chest pain, palpitations, claudication and leg swelling. Gastrointestinal: Negative for abdominal pain, constipation, diarrhea, nausea and vomiting. Genitourinary: Negative for dysuria, frequency, hematuria and urgency. Musculoskeletal: Negative for back pain, falls, joint pain, myalgias and neck pain. Skin: Negative for itching and rash. Neurological: Negative for dizziness, tingling, sensory change, speech change, focal weakness, weakness and headaches. Psychiatric/Behavioral: Positive for memory loss. Negative for depression, hallucinations, substance abuse and suicidal ideas. The patient is not nervous/anxious. Objective:  
Vital Signs: (As obtained by patient/caregiver at home) There were no vitals taken for this visit. [INSTRUCTIONS:  \"[x]\" Indicates a positive item  \"[]\" Indicates a negative item  -- DELETE ALL ITEMS NOT EXAMINED] Constitutional: [x] Appears well-developed and well-nourished [x] No apparent distress   
  [] Abnormal - Mental status: [x] Alert and awake  [x] Oriented to person/place/time [x] Able to follow commands   
[] Abnormal - Eyes:   EOM    [x]  Normal    [] Abnormal -  
Sclera  [x]  Normal    [] Abnormal - Please schedule Medicare wellness in 3 months        discharge [x]  None visible   [] Abnormal - HENT: [x] Normocephalic, atraumatic  [] Abnormal -  
[x] Mouth/Throat: Mucous membranes are moist 
 
External Ears [x] Normal  [] Abnormal - Neck: [x] No visualized mass [] Abnormal - Pulmonary/Chest: [x] Respiratory effort normal   [x] No visualized signs of difficulty breathing or respiratory distress 
      [] Abnormal - Musculoskeletal:   [] Normal gait with no signs of ataxia [] Normal range of motion of neck [x] Abnormal -  
 
Neurological:        [x] No Facial Asymmetry (Cranial nerve 7 motor function) (limited exam due to video visit) [x] No gaze palsy  
     [] Abnormal -   
     
Skin:        [x] No significant exanthematous lesions or discoloration noted on facial skin   
     [] Abnormal - Psychiatric:       [] Normal Affect [x] Abnormal - flat affect [x] No Hallucinations Other pertinent observable physical exam findings:- 
 
 
 
We discussed the expected course, resolution and complications of the diagnosis(es) in detail. Medication risks, benefits, costs, interactions, and alternatives were discussed as indicated. I advised her to contact the office if her condition worsens, changes or fails to improve as anticipated. She expressed understanding with the diagnosis(es) and plan. Mary Harvey is a 80 y.o. female who was evaluated by a video visit encounter for concerns as above. Patient identification was verified prior to start of the visit. A caregiver was present when appropriate. Due to this being a TeleHealth encounter (During EEIZV-05 public health emergency), evaluation of the following organ systems was limited: Vitals/Constitutional/EENT/Resp/CV/GI//MS/Neuro/Skin/Heme-Lymph-Imm. Pursuant to the emergency declaration under the [facility]5 waiver authority and the [employer] and [employer] General Act, this Virtual  Visit was conducted, with patient's (and/or legal guardian's) consent, to reduce the patient's risk of exposure to COVID-19 and provide necessary medical care.  
  
Services were provided through a video synchronous discussion virtually to substitute for in-person clinic visit. Patient and provider were located at their individual homes.  
 
 
Fabricio Mac MD

## 2020-07-08 ENCOUNTER — PATIENT OUTREACH (OUTPATIENT)
Dept: CASE MANAGEMENT | Age: 85
End: 2020-07-08

## 2020-07-08 RX ORDER — ACETAMINOPHEN 500 MG
650 TABLET ORAL
COMMUNITY
Start: 2021-01-01 | End: 2021-01-01

## 2020-07-08 RX ORDER — CEPHALEXIN 500 MG/1
500 CAPSULE ORAL EVERY 12 HOURS
COMMUNITY
Start: 2020-07-07 | End: 2020-07-12

## 2020-07-08 RX ORDER — AMLODIPINE BESYLATE 5 MG/1
5 TABLET ORAL DAILY
COMMUNITY
Start: 2020-07-07 | End: 2020-07-21 | Stop reason: SDUPTHER

## 2020-07-08 NOTE — PROGRESS NOTES
Patient was admitted to [facility]  on 20 and discharged on 20 for syncope. Patient was contacted within 1 business days of discharge. Top Discharge Challenges to be reviewed by the provider Additional needs identified to be addressed with provider yes 2-3 X per day pt cough up alot of phelgm and belches; caregiver concern about relfux. Caregiver also reported decrease in appetite X 2-3 days Discussed COVID-19 related testing which was not done at this time. Test results were not done. Patient informed of results, if available? N/a Method of communication with provider : chart routing Advance Care Planning:  
Does patient have an Advance Directive:  not on file Inpatient Readmission Risk score: N/A Was this a readmission? no  
Patient stated reason for the admission: N/A Patients top risk factors for readmission: None at this time Interventions to address risk factors: N/A Care Transition Nurse (CTN) contacted the caregiver by telephone to perform post hospital discharge assessment. Spoke with Saurav Gonzalezbeni, niece/caregiver. Verified name and  with caregiver as identifiers. Provided introduction to self, and explanation of the CTN role. Caregiver voiced it is hard to get patient to drink water and she like to drink coffee and hot tea. Advised caregiver to use decaffeinated tea and coffee. She voiced that is what she buys. Writer suggested try using water flavor packs in water. Caregiver voiced she will try some Crystal Light. CTN reviewed discharge instructions, medical action plan and red flags with caregiver who verbalized understanding. Caregiver given an opportunity to ask questions and does not have any further questions or concerns at this time. The caregiver agrees to contact the PCP office for questions related to their healthcare.   
 
Medication reconciliation was performed with caregiver, who verbalizes understanding of administration of home medications. Advised obtaining a 90-day supply of all daily and as-needed medications. Referral to Pharm D needed: no  
 
Home Health/Outpatient orders at discharge: none Home Health company: N/A Date of initial visit: N/A Durable Medical Equipment ordered at discharge: None [address]: N/A Durable Medical Equipment received: N/A Covid Risk Education Patient has following risk factors of: age. Education provided regarding infection prevention, and signs and symptoms of COVID-19 and when to seek medical attention with caregiver who verbalized understanding. Discussed exposure protocols and quarantine From CDC: Are you at higher risk for severe illness?  and given an opportunity for questions and concerns. The caregiver agrees to contact the COVID-19 hotline [phone] or PCP office for questions related to COVID-19. For more information on steps you can take to protect yourself, see CDC's How to Protect Yourself Patient/family/caregiver given information for [employer] and agrees to enroll yes Patient's preferred e-mail: [email or web address] Patient's preferred phone number: [phone] Discussed follow-up appointments. If no appointment was previously scheduled, appointment scheduling offered: N/A 
[facility] follow up appointment(s):  
Future Appointments Date Time Provider Haider Reich 7/10/2020  2:30 PM MD ARIELLE CoronelMA DELL العراقي  
9/16/2020 10:00 AM Brayan Weinberg MD [facility] follow up appointment(s): N/A Plan for follow-up call in 7-10 days based on severity of symptoms and risk factors. CTN provided contact information for future needs. Goals Addressed This Visit's Progress  Attends follow-up appointments as directed.  Prevent complications post hospitalization.

## 2020-07-08 NOTE — Clinical Note
[facility] 7/5-7/7 syncope ZOILA appt 7/10 @ 2:30 PM Patient was started on Norvasc and Keflex for tx of UTI. Losartan was not on [facility] med list?  Should patient continue to take losartan. Please advise. Also please review note.

## 2020-07-08 NOTE — PROGRESS NOTES
Patient is currently enrolled in a remote symptom monitoring program.  Start day 7.8.2020; End Date 7.23.2020

## 2020-07-10 ENCOUNTER — OFFICE VISIT (OUTPATIENT)
Dept: FAMILY MEDICINE CLINIC | Age: 85
End: 2020-07-10

## 2020-07-10 VITALS
OXYGEN SATURATION: 100 % | WEIGHT: 142.4 LBS | TEMPERATURE: 98 F | DIASTOLIC BLOOD PRESSURE: 71 MMHG | SYSTOLIC BLOOD PRESSURE: 127 MMHG | HEIGHT: 59 IN | BODY MASS INDEX: 28.71 KG/M2 | RESPIRATION RATE: 16 BRPM | HEART RATE: 80 BPM

## 2020-07-10 DIAGNOSIS — I10 ESSENTIAL HYPERTENSION: Primary | ICD-10-CM

## 2020-07-10 DIAGNOSIS — F01.50 VASCULAR DEMENTIA WITHOUT BEHAVIORAL DISTURBANCE (HCC): ICD-10-CM

## 2020-07-10 DIAGNOSIS — R55 SYNCOPE, UNSPECIFIED SYNCOPE TYPE: ICD-10-CM

## 2020-07-10 DIAGNOSIS — R41.3 MEMORY LOSS: ICD-10-CM

## 2020-07-10 NOTE — PROGRESS NOTES
Jero Flowers Chief Complaint Patient presents with  Dizziness Vitals:  
 07/10/20 1449 BP: 127/71 Pulse: 80 Resp: 16 Temp: 98 °F (36.7 °C) SpO2: 100% Weight: 142 lb 6.4 oz (64.6 kg) Height: 4' 11\" (1.499 m) PainSc:   0 - No pain HPI: Patient is here today for follow-up after discharge from the hospital few days ago, she had a brief syncopal episode at home, possible dueto dehydration, patient is not drinking enough water daily, record and hospital admission record has been reviewed. She is feeling well now with no acute complaints. She is accompanied by her caregiver her niece. Medication has been reviewed and updated During that admission losartan has been discontinued and now she is on amlodipine blood pressures well controlled Patient was admitted to [facility]  on 7/5/20 and discharged on 7/7/20 for syncope. Patient was contacted within 1 business days of discharge. Procedures:  
[place] [id number] [facility] Course:80year-old female comes with an episode of syncope. Family witnessed patient slumping over in the chair, she became diaphoretic and had grunting. She is now back to baseline. Per niece who spoke to emergency room provider, patient had a similar episode 2 to 3 years ago and was diagnosed with dehydration at that time. No seizure-like activity no focal weakness no headache or blurred vision. No sick contacts no fever chills or dyspnea History reviewed. No pertinent past medical history. History reviewed. No pertinent surgical history. Pt work up is negative Pt had worsening of confusion while in hospital due to sun downing Spoke to pt niece,after lengthy discussion, decided to go home, which would be best option. Pending investigations/labs for PCP to follow: none LEFT VENTRICULAR SYSTOLIC FUNCTION IS NORMAL. EJECTION FRACTION OF 60%. NO WALL MOTION ABNORMALITIES. MILD DIASTOLIC DYSFUNCTION. MILD CONCENTRIC LEFT VENTRICULAR HYPERTROPHY. NORMAL RIGHT VENTRICULAR SIZE. NORMAL RIGHT VENTRICULAR GLOBAL SYSTOLIC FUNCTION. MILD MITRAL REGURGITATION. ESTIMATED PULMONARY ARTERY PRESSURE IS 21MMHG. NO PREVIOUS REPORT FOR COMPARISON. Past Medical History:  
Diagnosis Date  Arthritis  Hypertension Past Surgical History:  
Procedure Laterality Date  HX HIP REPLACEMENT Social History Tobacco Use  Smoking status: Never Smoker  Smokeless tobacco: Never Used Substance Use Topics  Alcohol use: Not Currently Family History Problem Relation Age of Onset  No Known Problems Mother  No Known Problems Father Review of Systems Constitutional: Positive for malaise/fatigue. Negative for chills, fever and weight loss. HENT: Negative for congestion, ear discharge, ear pain, hearing loss, nosebleeds, sinus pain and sore throat. Eyes: Negative for blurred vision, double vision and discharge. Respiratory: Positive for cough and sputum production. Cardiovascular: Negative for chest pain, palpitations, claudication and leg swelling. Gastrointestinal: Negative for abdominal pain, constipation, diarrhea, nausea and vomiting. Genitourinary: Negative for dysuria, frequency and urgency. Musculoskeletal: Negative for myalgias. Skin: Negative for itching and rash. Neurological: Negative for dizziness, tingling, sensory change, speech change, focal weakness, weakness and headaches. Psychiatric/Behavioral: Negative for depression and suicidal ideas. Physical Exam 
Constitutional:   
   General: She is not in acute distress. Appearance: She is well-developed. She is not diaphoretic. HENT:  
   Head: Normocephalic and atraumatic. Eyes:  
   General: No scleral icterus. Right eye: No discharge. Left eye: No discharge. Conjunctiva/sclera: Conjunctivae normal.  
   Pupils: Pupils are equal, round, and reactive to light. Neck:  
   Thyroid: No thyromegaly. Cardiovascular:  
   Rate and Rhythm: Normal rate and regular rhythm. Heart sounds: Normal heart sounds. Pulmonary:  
   Effort: Pulmonary effort is normal. No respiratory distress. Breath sounds: Normal breath sounds. No wheezing or rales. Chest:  
   Chest wall: No tenderness. Abdominal:  
   General: There is no distension. Palpations: Abdomen is soft. Tenderness: There is no abdominal tenderness. There is no rebound. Musculoskeletal:     
   General: No tenderness. Comments: Patient is using a walker Lymphadenopathy:  
   Cervical: No cervical adenopathy. Skin: 
   General: Skin is warm and dry. Coloration: Skin is not pale. Findings: No erythema or rash. Neurological:  
   Mental Status: She is alert and oriented to person, place, and time. Cranial Nerves: No cranial nerve deficit. Coordination: Coordination normal.  
Psychiatric:     
   Behavior: Behavior normal.     
   Thought Content: Thought content normal.     
   Judgment: Judgment normal.  
 
  
 
Assessment and plan  
 
Plan of care has been discussed with the patient, he agrees to the plan and verbalized understanding. All his questions were answered More than 50% of the time spent in this visit was counseling the patient about  illness and treatment options 1. Essential hypertension Blood pressures well controlled 2. Syncope, unspecified syncope type 
 one episode no recurrence-niece is working on getting her hydrated using different ways of popsicle ,water and reducing caffeine 3. Memory loss Stable 4. Vascular dementia without behavioral disturbance ([facility] [address] Plan stable Current Outpatient Medications Medication Sig Dispense Refill  amLODIPine (NORVASC) 5 mg tablet Take 5 mg by mouth daily.  acetaminophen (Tylenol Extra Strength) 500 mg tablet Take  by mouth every six (6) hours as needed for Pain.  acetaminophen (TYLENOL ARTHRITIS PO) Take 500 mg by mouth as needed for Cough, Diarrhea, Fever, Nausea or Pain.  losartan (COZAAR) 50 mg tablet TAKE 1 1/2 TABLETS BY MOUTH EVERY DAY (Patient taking differently: Patient is only taking 50 mg tab, she is not taking the half tab with the one 50 mg tab) 135 Tab 0 Patient Active Problem List  
 Diagnosis Date Noted  Essential hypertension 09/16/2019  Left hip pain 05/20/2019  
 History of left hip replacement 05/20/2019  Systolic hypertension 75/75/6059  Mixed hyperlipidemia 05/11/2019  Dementia without behavioral disturbance ([facility] [address] 05/11/2019 Results for orders placed or performed during the hospital encounter of 05/26/20 CBC WITH AUTOMATED DIFF Result Value Ref Range WBC 6.1 4.6 - 13.2 K/uL  
 RBC 3.95 (L) 4.20 - 5.30 M/uL  
 HGB 11.7 (L) 12.0 - 16.0 g/dL HCT 35.9 35.0 - 45.0 % MCV 90.9 74.0 - 97.0 FL  
 MCH 29.6 24.0 - 34.0 PG  
 MCHC 32.6 31.0 - 37.0 g/dL  
 RDW 14.0 11.6 - 14.5 % PLATELET 736 747 - 333 K/uL MPV 11.2 9.2 - 11.8 FL  
 NEUTROPHILS 67 40 - 73 % LYMPHOCYTES 25 21 - 52 % MONOCYTES 7 3 - 10 % EOSINOPHILS 1 0 - 5 % BASOPHILS 0 0 - 2 %  
 ABS. NEUTROPHILS 4.1 1.8 - 8.0 K/UL  
 ABS. LYMPHOCYTES 1.5 0.9 - 3.6 K/UL  
 ABS. MONOCYTES 0.4 0.05 - 1.2 K/UL  
 ABS. EOSINOPHILS 0.1 0.0 - 0.4 K/UL  
 ABS. BASOPHILS 0.0 0.0 - 0.1 K/UL  
 DF AUTOMATED METABOLIC PANEL, COMPREHENSIVE Result Value Ref Range Sodium 138 136 - 145 mmol/L Potassium 4.5 3.5 - 5.5 mmol/L Chloride 106 100 - 111 mmol/L  
 CO2 26 21 - 32 mmol/L Anion gap 6 3.0 - 18 mmol/L Glucose 85 74 - 99 mg/dL BUN 15 7.0 - 18 MG/DL Creatinine 0.94 0.6 - 1.3 MG/DL  
 BUN/Creatinine ratio 16 12 - 20 GFR est AA >60 >60 ml/min/1.73m2 GFR est non-AA 55 (L) >60 ml/min/1.73m2 Calcium 8.4 (L) 8.5 - 10.1 MG/DL  Bilirubin, total 0.3 0.2 - 1.0 MG/DL  
 ALT (SGPT) 16 13 - 56 U/L  
 AST (SGOT) 16 10 - 38 U/L  
 Alk. phosphatase 82 45 - 117 U/L Protein, total 7.1 6.4 - 8.2 g/dL Albumin 3.4 3.4 - 5.0 g/dL Globulin 3.7 2.0 - 4.0 g/dL A-G Ratio 0.9 0.8 - 1.7 Hospital Outpatient Visit on 05/26/2020 Component Date Value Ref Range Status  WBC 05/26/2020 6.1  4.6 - 13.2 K/uL Final  
 RBC 05/26/2020 3.95* 4.20 - 5.30 M/uL Final  
 HGB 05/26/2020 11.7* 12.0 - 16.0 g/dL Final  
 HCT 05/26/2020 35.9  35.0 - 45.0 % Final  
 MCV 05/26/2020 90.9  74.0 - 97.0 FL Final  
 MCH 05/26/2020 29.6  24.0 - 34.0 PG Final  
 MCHC 05/26/2020 32.6  31.0 - 37.0 g/dL Final  
 RDW 05/26/2020 14.0  11.6 - 14.5 % Final  
 PLATELET 59/70/8527 450  135 - 420 K/uL Final  
 MPV 05/26/2020 11.2  9.2 - 11.8 FL Final  
 NEUTROPHILS 05/26/2020 67  40 - 73 % Final  
 LYMPHOCYTES 05/26/2020 25  21 - 52 % Final  
 MONOCYTES 05/26/2020 7  3 - 10 % Final  
 EOSINOPHILS 05/26/2020 1  0 - 5 % Final  
 BASOPHILS 05/26/2020 0  0 - 2 % Final  
 ABS. NEUTROPHILS 05/26/2020 4.1  1.8 - 8.0 K/UL Final  
 ABS. LYMPHOCYTES 05/26/2020 1.5  0.9 - 3.6 K/UL Final  
 ABS. MONOCYTES 05/26/2020 0.4  0.05 - 1.2 K/UL Final  
 ABS. EOSINOPHILS 05/26/2020 0.1  0.0 - 0.4 K/UL Final  
 ABS. BASOPHILS 05/26/2020 0.0  0.0 - 0.1 K/UL Final  
 DF 05/26/2020 AUTOMATED    Final  
 Sodium 05/26/2020 138  136 - 145 mmol/L Final  
 Potassium 05/26/2020 4.5  3.5 - 5.5 mmol/L Final  
 Chloride 05/26/2020 106  100 - 111 mmol/L Final  
 CO2 05/26/2020 26  21 - 32 mmol/L Final  
 Anion gap 05/26/2020 6  3.0 - 18 mmol/L Final  
 Glucose 05/26/2020 85  74 - 99 mg/dL Final  
 BUN 05/26/2020 15  7.0 - 18 MG/DL Final  
 Creatinine 05/26/2020 0.94  0.6 - 1.3 MG/DL Final  
 BUN/Creatinine ratio 05/26/2020 16  12 - 20   Final  
 GFR est AA 05/26/2020 >60  >60 ml/min/1.73m2 Final  
 GFR est non-AA 05/26/2020 55* >60 ml/min/1.73m2 Final  
 Comment: (NOTE) Estimated GFR is calculated using the Modification of Diet in Renal  
 Disease (MDRD) Study equation, reported for both  Americans [facility]) and non- Americans (GFRNA), and normalized to 1.73m2  
body surface area. The physician must decide which value applies to  
the patient. The MDRD study equation should only be used in  
individuals age 25 or older. It has not been validated for the  
following: pregnant women, patients with serious comorbid conditions,  
or on certain medications, or persons with extremes of body size,  
muscle mass, or nutritional status.  Calcium 05/26/2020 8.4* 8.5 - 10.1 MG/DL Final  
 Bilirubin, total 05/26/2020 0.3  0.2 - 1.0 MG/DL Final  
 ALT (SGPT) 05/26/2020 16  13 - 56 U/L Final  
 AST (SGOT) 05/26/2020 16  10 - 38 U/L Final  
 Alk. phosphatase 05/26/2020 82  45 - 117 U/L Final  
 Protein, total 05/26/2020 7.1  6.4 - 8.2 g/dL Final  
 Albumin 05/26/2020 3.4  3.4 - 5.0 g/dL Final  
 Globulin 05/26/2020 3.7  2.0 - 4.0 g/dL Final  
 A-G Ratio 05/26/2020 0.9  0.8 - 1.7   Final  
  
 
 
Follow-up and Dispositions · Return in about 3 months (around 10/10/2020).

## 2020-07-10 NOTE — PROGRESS NOTES
Discharged from hospital 2020. Cesar Brice is a 80 y.o. female (: 1921) presenting to address: Chief Complaint Patient presents with  Dizziness Vitals:  
 07/10/20 1449 BP: 127/71 Pulse: 80 Resp: 16 Temp: 98 °F (36.7 °C) SpO2: 100% Weight: 142 lb 6.4 oz (64.6 kg) Height: 4' 11\" (1.499 m) PainSc:   0 - No pain Hearing/Vision: No exam data present Learning Assessment:  
 
Learning Assessment 2019 PRIMARY LEARNER Patient PRIMARY LANGUAGE ENGLISH  
LEARNER PREFERENCE PRIMARY VIDEOS  
  PICTURES  
  DEMONSTRATION  
ANSWERED BY patient RELATIONSHIP SELF Depression Screening:  
 
3 most recent PHQ Screens 2020 Little interest or pleasure in doing things Not at all Feeling down, depressed, irritable, or hopeless Not at all Total Score PHQ 2 0 Fall Risk Assessment:  
 
Fall Risk Assessment, last 12 mths 2020 Able to walk? Yes Fall in past 12 months? No  
Fall with injury? -  
Number of falls in past 12 months - Fall Risk Score -  
 
Abuse Screening:  
 
Abuse Screening Questionnaire 2019 Do you ever feel afraid of your partner? Jerald Peppers Are you in a relationship with someone who physically or mentally threatens you? Jerald Peppers Is it safe for you to go home? Sofiya Pederson Coordination of Care Questionaire: 1. Have you been to the ER, urgent care clinic since your last visit? Hospitalized since your last visit? Yes [facility]  Discharge 20 syncope 2. Have you seen or consulted any other health care providers outside of the [address] [place] [address] since your last visit? Include any pap smears or colon screening. Yes, audiology  mitch Davalos Advanced Directive: 1. Do you have an Advanced Directive? No  
2. Would you like information on Advanced Directives? No  
 
 
Health Maintenance Due Topic Date Due  
 DTaP/Tdap/Td series (1 - Tdap) 1942  Shingrix Vaccine Age 50> (1 of 2) 1971  GLAUCOMA SCREENING Q2Y  11/13/1986 Accompanied by javier Peterson

## 2020-07-21 ENCOUNTER — PATIENT OUTREACH (OUTPATIENT)
Dept: CASE MANAGEMENT | Age: 85
End: 2020-07-21

## 2020-07-21 DIAGNOSIS — I10 ESSENTIAL HYPERTENSION: Primary | ICD-10-CM

## 2020-07-21 RX ORDER — AMLODIPINE BESYLATE 5 MG/1
5 TABLET ORAL DAILY
Qty: 90 TAB | Refills: 1 | Status: SHIPPED | OUTPATIENT
Start: 2020-07-21 | End: 2020-08-05 | Stop reason: SDUPTHER

## 2020-07-21 NOTE — TELEPHONE ENCOUNTER
Spoke with Darling Tran, niece, she voiced patient only has 7 tabs of Norvasc left and requested a refill. Patient uses [facility] on [address]

## 2020-07-21 NOTE — PROGRESS NOTES
Transitions of Care Coordination  Follow-Up    Date/Time:  7/21/2020 9:55 AM     CTN (Care Transitions Nurse) contacted family for Transitions of Care Coordination  follow up. Spoke to Ms. Sherrell Ornelas, niece/ family. Introduced self/role and reason for call. Niece reported patient is doing goo. Eating once to twice a day. Niece providing Ensure when patient does not want to eat second meal. Niece having patient drink water every 1-2 hours. Writer advised niece to limit caffeine to 1-2 cups daily. Niece voiced understanding. Denied syncope, dizziness or cold sweats. Specialist appointments since last outreach? no  If so, specialist and date: N/A    Medications:   New medications since last outreach: no  Does patient need refills on any medications: yes, pended refill for Norvasc 5 mg  Medication changes since last outreach (dose adjustments or discontinued meds): no    Home Health company: N/A  Date of discharge: N/a    Barriers to care? None at this time    Discussed exposure protocols and quarantine from [address] you at higher risk for severe illness 2019 and given an opportunity for questions and concerns. From CDC: Are you at higher risk for severe illness?  Wash your hands often.  Avoid close contact (6 feet, which is about two arm lengths) with people who are sick.  Put distance between yourself and other people if COVID-19 is spreading in your community.  Clean and disinfect frequently touched surfaces.  Avoid all cruise travel and non-essential air travel.  Call your healthcare professional if you have concerns about COVID-19 and your underlying condition or if you are sick. Family reminded that there are physicians on call 24 hours a day / 7 days a week (M-F 5pm to 8am and from Friday 5pm until Monday 8a for the weekend) should the patient have questions or concerns.      Future Appointments   Date Time Provider Haider Reich   9/16/2020 10:00 AM Sylvia Smith MD [place]        Other upcoming appointments:  N/A    Goals      Attends follow-up appointments as directed. Goal: Patient will attend all appts scheduled within the next 30 days post d/c.    7/21: Patient attended [facility] appt as scheduled on 7/10       Prevent complications post hospitalization.       Plan of Care:    Review/educate common or potential \"red flags\" of condition worsening  Instruct on adherence to medications as ordered and assess for therapeutic response and side-effects        Assess for health risk behaviors and educate patient/caregivers on reducing risk ; 7/21: Reinforced importance of good hydration and limiting caffeine intake   Observe for trends in symptoms and measures, provide direction to patient, and notify provider as needed

## 2020-08-04 ENCOUNTER — PATIENT OUTREACH (OUTPATIENT)
Dept: CASE MANAGEMENT | Age: 85
End: 2020-08-04

## 2020-08-04 NOTE — PROGRESS NOTES
Transitions of Care Coordination  Follow-Up Date/Time:  8/4/2020 3:58 PM 
  
CTN (Care Transitions Nurse) contacted family for Transitions of Care Coordination  follow up. Spoke to family. Introduced self/role and reason for call. Family reported:  
Drinking more water \"passing out spell last Monday\" 
; got over heated again\" Pertinent negatives: 
Dizziness Change in mental status Abdominal pain N/V Increased urinary freq Advised niece to check patient's temp if she has another \"spell\" and report no temp greater than 100.5 or less than 95.7. Niece voiced understanding. Also reviewed other s/s to monitor for. Specialist appointments since last outreach? no 
If so, specialist and date: N/A Medications:  
New medications since last outreach: no 
Does patient need refills on any medications: no 
Medication changes since last outreach (dose adjustments or discontinued meds): no 
 
Niece reported $135 out of pocket cost for Norvasc. Niece denied any changes to patient's insurance. Writer contacted [employer]. Provided 2 patient identifiers. They voiced there was no Rx on file for patient. Returned call to javier. She voiced they must have put it back and plans to take patient's insurance info to pharmacy. Writer requested a return call tomorrow. Home Health company: N/A Date of discharge: N/A Barriers to care? None at this time Discussed exposure protocols and quarantine from [address] you at higher risk for severe illness 2019 and given an opportunity for questions and concerns. From CDC: Are you at higher risk for severe illness?  Wash your hands often.  Avoid close contact (6 feet, which is about two arm lengths) with people who are sick.  Put distance between yourself and other people if COVID-19 is spreading in your community.  Clean and disinfect frequently touched surfaces.  Avoid all cruise travel and non-essential air travel.  Call your healthcare professional if you have concerns about COVID-19 and your underlying condition or if you are sick. Family reminded that there are physicians on call 24 hours a day / 7 days a week (M-F 5pm to 8am and from Friday 5pm until Monday 8a for the weekend) should the patient have questions or concerns. Future Appointments Date Time Provider Haider Reich 9/16/2020 10:00 AM Kati Morales MD [facility] Other upcoming appointments: N/A Goals  Attends follow-up appointments as directed. Goal: Patient will attend all appts scheduled within the next 30 days post d/c. 
 
7/21: Patient attended [facility] appt as scheduled on 7/10  Prevent complications post hospitalization. Plan of Care:   
Review/educate common or potential \"red flags\" of condition worsening; 8/4: Reviewed s/s of infection to monitor and report Instruct on adherence to medications as ordered and assess for therapeutic response and side-effects Assess for health risk behaviors and educate patient/caregivers on reducing risk; 7/21: Reinforced importance of good hydration and limiting caffeine intake. 8/4: Patient drinking 1/2 cup of coffee and the rest water or juice Observe for trends in symptoms and measures, provide direction to patient, and notify provider as needed

## 2020-08-05 DIAGNOSIS — I10 ESSENTIAL HYPERTENSION: ICD-10-CM

## 2020-08-05 NOTE — TELEPHONE ENCOUNTER
Requested Prescriptions     Pending Prescriptions Disp Refills    amLODIPine (NORVASC) 5 mg tablet 90 Tab 1     Sig: Take 1 Tab by mouth daily. Ms Nkechi Quintanilla called on behalf of the pt, she just wanted to let her know that her pharmacy is claiming to have never received her script for amlodipine so she is requesting for it to be sent. Her ins is requiring that it be a 90 day supply. Please advise.      Future Appointments   Date Time Provider Haider Reich   9/16/2020 10:00 AM Emeterio Green MD [facility]

## 2020-08-06 RX ORDER — AMLODIPINE BESYLATE 5 MG/1
5 TABLET ORAL DAILY
Qty: 90 TAB | Refills: 1 | Status: SHIPPED | OUTPATIENT
Start: 2020-08-06 | End: 2021-01-01

## 2020-08-06 NOTE — TELEPHONE ENCOUNTER
Patient last appt was 07/12/2020. Spoke with [facility] and they stated that they never got the prescription for the patient. Can you resend it please.

## 2020-08-07 ENCOUNTER — PATIENT OUTREACH (OUTPATIENT)
Dept: CASE MANAGEMENT | Age: 85
End: 2020-08-07

## 2020-08-07 NOTE — PROGRESS NOTES
Spoke with Hoyt Litten, niece. She was able to  Norvasc prescription. Denied any issues at this time.

## 2020-08-23 RX ORDER — LOSARTAN POTASSIUM 50 MG/1
TABLET ORAL
Qty: 135 TAB | Refills: 0 | OUTPATIENT
Start: 2020-08-23

## 2020-09-16 ENCOUNTER — HOSPITAL ENCOUNTER (OUTPATIENT)
Dept: LAB | Age: 85
Discharge: HOME | End: 2020-09-16
Payer: MEDICARE

## 2020-09-16 ENCOUNTER — OFFICE VISIT (OUTPATIENT)
Dept: FAMILY MEDICINE CLINIC | Age: 85
End: 2020-09-16

## 2020-09-16 VITALS
HEART RATE: 70 BPM | OXYGEN SATURATION: 100 % | WEIGHT: 139 LBS | DIASTOLIC BLOOD PRESSURE: 74 MMHG | TEMPERATURE: 98.1 F | SYSTOLIC BLOOD PRESSURE: 134 MMHG | RESPIRATION RATE: 16 BRPM | BODY MASS INDEX: 28.07 KG/M2

## 2020-09-16 DIAGNOSIS — I10 ESSENTIAL HYPERTENSION: ICD-10-CM

## 2020-09-16 DIAGNOSIS — R35.0 FREQUENCY OF URINATION: ICD-10-CM

## 2020-09-16 DIAGNOSIS — H02.401 DROOPY EYELID, RIGHT: ICD-10-CM

## 2020-09-16 DIAGNOSIS — Z00.00 MEDICARE ANNUAL WELLNESS VISIT, INITIAL: ICD-10-CM

## 2020-09-16 DIAGNOSIS — Z23 NEEDS FLU SHOT: ICD-10-CM

## 2020-09-16 DIAGNOSIS — Z00.00 MEDICARE ANNUAL WELLNESS VISIT, INITIAL: Primary | ICD-10-CM

## 2020-09-16 DIAGNOSIS — F03.90 DEMENTIA WITHOUT BEHAVIORAL DISTURBANCE, UNSPECIFIED DEMENTIA TYPE: ICD-10-CM

## 2020-09-16 LAB
ALBUMIN SERPL-MCNC: 3.6 G/DL (ref 3.4–5)
ALBUMIN/GLOB SERPL: 1 {RATIO} (ref 0.8–1.7)
ALP SERPL-CCNC: 68 U/L (ref 45–117)
ALT SERPL-CCNC: 29 U/L (ref 13–56)
ANION GAP SERPL CALC-SCNC: 5 MMOL/L (ref 3–18)
APPEARANCE UR: ABNORMAL
AST SERPL-CCNC: 28 U/L (ref 10–38)
BASOPHILS # BLD: 0 K/UL (ref 0–0.1)
BASOPHILS NFR BLD: 0 % (ref 0–2)
BILIRUB SERPL-MCNC: 0.8 MG/DL (ref 0.2–1)
BILIRUB UR QL: NEGATIVE
BUN SERPL-MCNC: 16 MG/DL (ref 7–18)
BUN/CREAT SERPL: 17 (ref 12–20)
CALCIUM SERPL-MCNC: 8.8 MG/DL (ref 8.5–10.1)
CHLORIDE SERPL-SCNC: 102 MMOL/L (ref 100–111)
CO2 SERPL-SCNC: 29 MMOL/L (ref 21–32)
COLOR UR: YELLOW
CREAT SERPL-MCNC: 0.94 MG/DL (ref 0.6–1.3)
DIFFERENTIAL METHOD BLD: ABNORMAL
EOSINOPHIL # BLD: 0 K/UL (ref 0–0.4)
EOSINOPHIL NFR BLD: 1 % (ref 0–5)
ERYTHROCYTE [DISTWIDTH] IN BLOOD BY AUTOMATED COUNT: 14.4 % (ref 11.6–14.5)
GLOBULIN SER CALC-MCNC: 3.7 G/DL (ref 2–4)
GLUCOSE SERPL-MCNC: 79 MG/DL (ref 74–99)
GLUCOSE UR STRIP.AUTO-MCNC: NEGATIVE MG/DL
HCT VFR BLD AUTO: 36 % (ref 35–45)
HGB BLD-MCNC: 11.8 G/DL (ref 12–16)
HGB UR QL STRIP: NEGATIVE
KETONES UR QL STRIP.AUTO: NEGATIVE MG/DL
LEUKOCYTE ESTERASE UR QL STRIP.AUTO: NEGATIVE
LYMPHOCYTES # BLD: 1.2 K/UL (ref 0.9–3.6)
LYMPHOCYTES NFR BLD: 23 % (ref 21–52)
MCH RBC QN AUTO: 29.4 PG (ref 24–34)
MCHC RBC AUTO-ENTMCNC: 32.8 G/DL (ref 31–37)
MCV RBC AUTO: 89.8 FL (ref 74–97)
MONOCYTES # BLD: 0.4 K/UL (ref 0.05–1.2)
MONOCYTES NFR BLD: 8 % (ref 3–10)
NEUTS SEG # BLD: 3.4 K/UL (ref 1.8–8)
NEUTS SEG NFR BLD: 68 % (ref 40–73)
NITRITE UR QL STRIP.AUTO: NEGATIVE
PH UR STRIP: >8.5 [PH] (ref 5–8)
PLATELET # BLD AUTO: 289 K/UL (ref 135–420)
PMV BLD AUTO: 10.4 FL (ref 9.2–11.8)
POTASSIUM SERPL-SCNC: 4.2 MMOL/L (ref 3.5–5.5)
PROT SERPL-MCNC: 7.3 G/DL (ref 6.4–8.2)
PROT UR STRIP-MCNC: NEGATIVE MG/DL
RBC # BLD AUTO: 4.01 M/UL (ref 4.2–5.3)
SODIUM SERPL-SCNC: 136 MMOL/L (ref 136–145)
SP GR UR REFRACTOMETRY: 1.01 (ref 1–1.03)
UROBILINOGEN UR QL STRIP.AUTO: 0.2 EU/DL (ref 0.2–1)
WBC # BLD AUTO: 5 K/UL (ref 4.6–13.2)

## 2020-09-16 PROCEDURE — 80053 COMPREHEN METABOLIC PANEL: CPT

## 2020-09-16 PROCEDURE — 36415 COLL VENOUS BLD VENIPUNCTURE: CPT

## 2020-09-16 PROCEDURE — 81003 URINALYSIS AUTO W/O SCOPE: CPT

## 2020-09-16 PROCEDURE — 85025 COMPLETE CBC W/AUTO DIFF WBC: CPT

## 2020-09-16 PROCEDURE — 87086 URINE CULTURE/COLONY COUNT: CPT

## 2020-09-16 RX ORDER — RIVASTIGMINE 4.6 MG/24H
1 PATCH, EXTENDED RELEASE TRANSDERMAL DAILY
COMMUNITY
Start: 2020-09-16 | End: 2021-01-01

## 2020-09-16 NOTE — PROGRESS NOTES
Hany Bhatti is a 80 y.o. female (: 1921) presenting to address: Chief Complaint Patient presents with Sangeetha Castillo Annual Wellness Visit  Immunization/Injection Flu Vitals:  
 20 1016 BP: 134/74 Pulse: 70 Resp: 16 Temp: 98.1 °F (36.7 °C) SpO2: 100% Weight: 139 lb (63 kg) Hearing/Vision:  
 
 Hearing Screening [address] Right ear:           
Left ear:           
Comments: Hearing Aid Visual Acuity Screening Right eye Left eye Both eyes Without correction:  20/100 20/200 With correction:     
 
 
Learning Assessment:  
 
Learning Assessment 2019 PRIMARY LEARNER Patient PRIMARY LANGUAGE ENGLISH  
LEARNER PREFERENCE PRIMARY VIDEOS  
  PICTURES  
  DEMONSTRATION  
ANSWERED BY patient RELATIONSHIP SELF Depression Screening:  
 
3 most recent PHQ Screens 2020 Little interest or pleasure in doing things Not at all Feeling down, depressed, irritable, or hopeless Not at all Total Score PHQ 2 0 Fall Risk Assessment:  
 
Fall Risk Assessment, last 12 mths 2020 Able to walk? Yes Fall in past 12 months? No  
Fall with injury? -  
Number of falls in past 12 months - Fall Risk Score -  
 
Abuse Screening:  
 
Abuse Screening Questionnaire 2020 Do you ever feel afraid of your partner? Kory Blevins Are you in a relationship with someone who physically or mentally threatens you? Kory Blevins Is it safe for you to go home? Anitha Wright Coordination of Care Questionaire: 1. Have you been to the ER, urgent care clinic since your last visit? Hospitalized since your last visit? YES, [facility] for Syncope due to Hydration 2. Have you seen or consulted any other health care providers outside of the [address] [place] [address] since your last visit? Include any pap smears or colon screening. YES, Neurology and Dr. Amanda Castro Advanced Directive: 1. Do you have an Advanced Directive?  YES 
 
 2. Would you like information on Advanced Directives?  NO

## 2020-09-16 NOTE — PROGRESS NOTES
(AWV) The Initial Medicare Annual Wellness Exam PROGRESS NOTE This is an Initial Medicare Annual Wellness Exam (AWV) (Performed 12 months after IPPE or effective date of Medicare Part B enrollment, Once in a lifetime) I have reviewed the patient's medical history in detail and updated the computerized patient record. Bobby Crowe is a 80 y.o.  female and presents for an annual wellness exam  
 
She is accompanied by her niece who is her guardian as well Patient has been having decreased energy eating less she is trying to substitute her meals with Ensure She had lost couple pounds since last visit. She also noticed drooping on the right eye 
 
 patient has been following up with Dr. Albertha Carrel for her right foot wart, she had debridement and currently she is only walking boot Patient Active Problem List  
Diagnosis Code  Mixed hyperlipidemia E78.2  Dementia without behavioral disturbance (HCC) F03.90  Left hip pain M25.552  
 History of left hip replacement Z96.642  Systolic hypertension K74  
 Essential hypertension I10 Patient Active Problem List  
 Diagnosis Date Noted  Essential hypertension 09/16/2019  Left hip pain 05/20/2019  
 History of left hip replacement 05/20/2019  Systolic hypertension 95/98/9136  Mixed hyperlipidemia 05/11/2019  Dementia without behavioral disturbance ([facility] [address] 05/11/2019 Current Outpatient Medications Medication Sig Dispense Refill  rivastigmine (EXELON) 4.6 mg/24 hr patch 1 Patch by TransDERmal route daily.  amLODIPine (NORVASC) 5 mg tablet Take 1 Tab by mouth daily. 90 Tab 1  
 acetaminophen (Tylenol Extra Strength) 500 mg tablet Take  by mouth every six (6) hours as needed for Pain.  acetaminophen (TYLENOL ARTHRITIS PO) Take 500 mg by mouth as needed for Cough, Diarrhea, Fever, Nausea or Pain. No Known Allergies Past Medical History:  
Diagnosis Date  Arthritis  Hypertension Past Surgical History:  
Procedure Laterality Date  HX HIP REPLACEMENT Family History Problem Relation Age of Onset  No Known Problems Mother  No Known Problems Father Social History Tobacco Use  Smoking status: Never Smoker  Smokeless tobacco: Never Used Substance Use Topics  Alcohol use: Not Currently ROS History obtained from mother and guardian her niece General ROS: positive for  - malaise 
negative for - chills or fever Psychological ROS: positive for - memory difficulties 
negative for - behavioral disorder, hallucinations, hostility or irritability Ophthalmic ROS: not able to get ENT ROS: positive for - hearing change 
negative for - epistaxis or nasal congestion Respiratory ROS: no cough, shortness of breath, or wheezing Cardiovascular ROS: no chest pain or dyspnea on exertion Gastrointestinal ROS: no abdominal pain, change in bowel habits, or black or bloody stools 
positive for - abdominal pain Genito-Urinary ROS: no dysuria, trouble voiding, or hematuria Musculoskeletal ROS: right foot is in walking boot after surgery Neurological ROS: positive for - gait disturbance, impaired coordination/balance and memory loss History Past Medical History:  
Diagnosis Date  Arthritis  Hypertension Past Surgical History:  
Procedure Laterality Date  HX HIP REPLACEMENT Current Outpatient Medications Medication Sig Dispense Refill  rivastigmine (EXELON) 4.6 mg/24 hr patch 1 Patch by TransDERmal route daily.  amLODIPine (NORVASC) 5 mg tablet Take 1 Tab by mouth daily. 90 Tab 1  
 acetaminophen (Tylenol Extra Strength) 500 mg tablet Take  by mouth every six (6) hours as needed for Pain.  acetaminophen (TYLENOL ARTHRITIS PO) Take 500 mg by mouth as needed for Cough, Diarrhea, Fever, Nausea or Pain. No Known Allergies Family History Problem Relation Age of Onset  No Known Problems Mother  No Known Problems Father Social History Tobacco Use  Smoking status: Never Smoker  Smokeless tobacco: Never Used Substance Use Topics  Alcohol use: Not Currently Patient Active Problem List  
Diagnosis Code  Mixed hyperlipidemia E78.2  Dementia without behavioral disturbance (HCC) F03.90  Left hip pain M25.552  
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  
 Essential hypertension I10 Health Maintenance History Immunizations reviewed, 
 
Flu vaccine will be given today ,  
Patient had eye exam in February Depression Risk Factor Screening:  
  
Patient Health Questionnaire (PHQ-2) · Over the last 2 weeks, how often have you been bothered by any of the following problems? ·  
· Little interest or pleasure in doing things? · Not at all. [0] · Feeling down, depressed, or hopeless? · Not at all. [0] Total Score: 0/6 Patient denies depression but she has memory difficulties PHQ-2 Assessment Scoring: A score of 2 or more requires further screening with the PHQ-9 Alcohol Risk Factor Screening:  
 
Women: On any occasion during the past 3 months, have you had more than 3 drinks containing alcohol? no 
 Do you average more than 7 drinks per week?no 
Men: On any occasion during the past 3 months, have you had more than 4 drinks containing alcohol? Do you average more than 14 drinks per week? Functional Ability and Level of Safety:  
 
Hearing Loss Hearing is good. The patient wears hearing aids. She can still not hear even with hearing. Difficult to communicate with the patient Activities of Daily Living Requires assistance with: bathing and hygiene and no ADLs Fall Risk Ambulatory aid device (15 pts) Abuse Screen Patient is not abused None Examination Physical Examination Vitals:  
 09/16/20 1016 BP: 134/74 Pulse: 70 Resp: 16 Temp: 98.1 °F (36.7 °C) SpO2: 100% Weight: 139 lb (63 kg) Body mass index is 28.07 kg/m². Evaluation of Cognitive Function: 
Mood/affect:normal  
Appearance:well groomed Family member/caregiver input:pateint  Is here with her care giver ( niece ) she is having less energy  
 
alert, well appearing, and in no distress, overweight and not oriented to time but oriented to place and person Patient Care Team: 
Atilio Soria MD as PCP - General (Internal Medicine) Atilio Soria MD as PCP - [address] [place] [address] Dr Camacho Provider End-of-life planning Advanced Directive in the case than an injury or illness causes the patient to be unable to make health care decisions Health Care Directive or Living Will: yes A copy will be performed to put in chart Advice/Referrals/Counselling/Plan:  
Education and counseling provided: 
Are appropriate based on today's review and evaluation Influenza Vaccine Screening for glaucoma Include in education list (weight loss, physical activity, smoking cessation, fall prevention, and nutrition) ICD-10-CM ICD-9-CM 1. Medicare annual wellness visit, initial  L35.90 E27.3 METABOLIC PANEL, COMPREHENSIVE  
   CBC WITH AUTOMATED DIFF 2. Needs flu shot  Z23 V04.81 FLU (FLUAD QUAD INFLUENZA VACCINE,QUAD,ADJUVANTED) 3. Essential hypertension  U57 466.6 METABOLIC PANEL, COMPREHENSIVE  
   CBC WITH AUTOMATED DIFF 4. Dementia without behavioral disturbance, unspecified dementia type ([facility]  F03.90 294.20   
5. Frequency of urination  R35.0 788.41 URINALYSIS W/ RFLX MICROSCOPIC  
   CULTURE, URINE 6. Droopy eyelid, right  H02.401 374.30 Encounter Diagnoses Name Primary?  Medicare annual wellness visit, initial Yes  Needs flu shot  Essential hypertension  Dementia without behavioral disturbance, unspecified dementia type ([facility]  Frequency of urination  Droopy eyelid, right Orders Placed This Encounter  CULTURE, URINE  Influenza Vaccine, QUAD, 65 Yrs +  IM  (Fluad [id number] )  METABOLIC PANEL, COMPREHENSIVE  
 URINALYSIS W/ RFLX MICROSCOPIC  CBC WITH AUTOMATED DIFF  rivastigmine (EXELON) 4.6 mg/24 hr patch Orders Placed This Encounter  CULTURE, URINE Standing Status:   Future Standing Expiration Date:   9/17/2021  Influenza Vaccine, QUAD, 65 Yrs +  IM  (Fluad [id number] )  METABOLIC PANEL, COMPREHENSIVE Standing Status:   Future Standing Expiration Date:   9/17/2021  URINALYSIS W/ RFLX MICROSCOPIC Standing Status:   Future Standing Expiration Date:   9/16/2021  CBC WITH AUTOMATED DIFF Standing Status:   Future Standing Expiration Date:   9/17/2021 Orders Placed This Encounter  CULTURE, URINE  Influenza Vaccine, QUAD, 65 Yrs +  IM  (Fluad [id number] )  METABOLIC PANEL, COMPREHENSIVE  
 URINALYSIS W/ RFLX MICROSCOPIC  CBC WITH AUTOMATED DIFF Diagnoses and all orders for this visit: 
 
1. Medicare annual wellness visit, initial 
-     METABOLIC PANEL, COMPREHENSIVE; Future -     CBC WITH AUTOMATED DIFF; Future 2. Needs flu shot Patient was given with no complication Given 0.5 mL RD IM [employer] [id number] Fluad Quad 2020-21(65y up)(PF)  
 
 
 
-     FLU (FLUAD QUAD INFLUENZA VACCINE,QUAD,ADJUVANTED) 3. Essential hypertension -     METABOLIC PANEL, COMPREHENSIVE; Future -     CBC WITH AUTOMATED DIFF; Future 4. Dementia without behavioral disturbance, unspecified dementia type ([facility] [address] 5. Frequency of urination 
-     URINALYSIS W/ RFLX MICROSCOPIC; Future -     CULTURE, URINE; Future 6. Droopy eyelid, right There is no neurological signs of stroke or 3rd nerve palsy Advised caregiver to follow-up with ophthalmologist 
 
 
Follow-up and Dispositions · Return in about 6 months (around 3/16/2021). current treatment plan is effective, no change in therapy 
lab results and schedule of future lab studies reviewed with patient.  
Brief written plan, checklist 
 
 I have discussed the diagnosis with the patient and the intended plan as seen in the above orders. The patient has received an after-visit summary and questions were answered concerning future plans. I have discussed medication side effects and warnings with the patient as well. I have reviewed the plan of care with the patient, accepted their input and they are in agreement with the treatment goals. Follow-up and Dispositions · Return in about 6 months (around 3/16/2021). ____________________________________________________________ Problem Assessment 
 
for treatment of  
Chief Complaint Patient presents with [facility] Annual Wellness Visit  Immunization/Injection Flu Visit Vitals /74 (BP 1 Location: Right arm, BP Patient Position: Sitting) Pulse 70 Temp 98.1 °F (36.7 °C) Resp 16 Wt 139 lb (63 kg) SpO2 100% BMI 28.07 kg/m² General appearance: alert, cooperative, no distress, appears stated age Eyes: Patient has clinically antibiotic on the right upper no signs of, facial weakness Ears: No wax impaction Neck: supple, symmetrical, trachea midline, no adenopathy and thyroid: not enlarged, symmetric, no tenderness/mass/nodules Lungs: clear to auscultation bilaterally Heart: regular rate and rhythm, S1, S2 normal, no murmur, click, rub or gallop Extremities: extremities normal, atraumatic, no cyanosis or edema Abdominal examination soft nontender patient has dry skin

## 2020-09-18 ENCOUNTER — TELEPHONE (OUTPATIENT)
Dept: FAMILY MEDICINE CLINIC | Age: 85
End: 2020-09-18

## 2020-09-18 DIAGNOSIS — Z51.81 ENCOUNTER FOR MONITORING NSAID THERAPY: ICD-10-CM

## 2020-09-18 DIAGNOSIS — H15.002 SCLERITIS OF LEFT EYE: Primary | ICD-10-CM

## 2020-09-18 DIAGNOSIS — Z79.1 ENCOUNTER FOR MONITORING NSAID THERAPY: ICD-10-CM

## 2020-09-18 DIAGNOSIS — I10 ESSENTIAL HYPERTENSION: ICD-10-CM

## 2020-09-18 LAB
BACTERIA SPEC CULT: NORMAL
CC UR VC: NORMAL
SERVICE CMNT-IMP: NORMAL

## 2020-09-18 NOTE — TELEPHONE ENCOUNTER
Dr. Hosea Che called cell phone number is [phone] he is expecting a phone call from you. He would like to discuss how to treat Mrs. Tom. 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 He's going to have her follow up in a week.

## 2020-09-19 NOTE — TELEPHONE ENCOUNTER
Thanks   I spoke to him and discussed her diagnosis and possible treatment with low dose NSAID for scleritis  To prevent  It become necrotizing and will  check BMP in 1 week he will contact her niece

## 2020-09-23 ENCOUNTER — HOSPITAL ENCOUNTER (OUTPATIENT)
Dept: LAB | Age: 85
Discharge: HOME | End: 2020-09-23
Payer: MEDICARE

## 2020-09-23 DIAGNOSIS — H15.002 SCLERITIS OF LEFT EYE: ICD-10-CM

## 2020-09-23 DIAGNOSIS — I10 ESSENTIAL HYPERTENSION: ICD-10-CM

## 2020-09-23 DIAGNOSIS — Z51.81 ENCOUNTER FOR MONITORING NSAID THERAPY: ICD-10-CM

## 2020-09-23 DIAGNOSIS — Z79.1 ENCOUNTER FOR MONITORING NSAID THERAPY: ICD-10-CM

## 2020-09-23 LAB
ANION GAP SERPL CALC-SCNC: 9 MMOL/L (ref 3–18)
BUN SERPL-MCNC: 16 MG/DL (ref 7–18)
BUN/CREAT SERPL: 18 (ref 12–20)
CALCIUM SERPL-MCNC: 9 MG/DL (ref 8.5–10.1)
CHLORIDE SERPL-SCNC: 99 MMOL/L (ref 100–111)
CO2 SERPL-SCNC: 25 MMOL/L (ref 21–32)
CREAT SERPL-MCNC: 0.89 MG/DL (ref 0.6–1.3)
GLUCOSE SERPL-MCNC: 97 MG/DL (ref 74–99)
POTASSIUM SERPL-SCNC: 4.4 MMOL/L (ref 3.5–5.5)
SODIUM SERPL-SCNC: 133 MMOL/L (ref 136–145)

## 2020-09-23 PROCEDURE — 36415 COLL VENOUS BLD VENIPUNCTURE: CPT

## 2020-09-23 PROCEDURE — 80048 BASIC METABOLIC PNL TOTAL CA: CPT

## 2020-09-29 ENCOUNTER — TELEPHONE (OUTPATIENT)
Dept: FAMILY MEDICINE CLINIC | Age: 85
End: 2020-09-29

## 2020-10-05 ENCOUNTER — VIRTUAL VISIT (OUTPATIENT)
Dept: FAMILY MEDICINE CLINIC | Age: 85
End: 2020-10-05
Payer: MEDICARE

## 2020-10-05 DIAGNOSIS — H02.401 PTOSIS OF RIGHT EYELID: ICD-10-CM

## 2020-10-05 DIAGNOSIS — I10 ESSENTIAL HYPERTENSION: ICD-10-CM

## 2020-10-05 DIAGNOSIS — F01.50 VASCULAR DEMENTIA WITHOUT BEHAVIORAL DISTURBANCE (HCC): Primary | ICD-10-CM

## 2020-10-05 DIAGNOSIS — H15.101 EPISCLERITIS OF RIGHT EYE: ICD-10-CM

## 2020-10-05 PROCEDURE — 99213 OFFICE O/P EST LOW 20 MIN: CPT | Performed by: LEGAL MEDICINE

## 2020-10-05 PROCEDURE — G8432 DEP SCR NOT DOC, RNG: HCPCS | Performed by: LEGAL MEDICINE

## 2020-10-05 PROCEDURE — G8428 CUR MEDS NOT DOCUMENT: HCPCS | Performed by: LEGAL MEDICINE

## 2020-10-05 PROCEDURE — 1090F PRES/ABSN URINE INCON ASSESS: CPT | Performed by: LEGAL MEDICINE

## 2020-10-05 PROCEDURE — 1101F PT FALLS ASSESS-DOCD LE1/YR: CPT | Performed by: LEGAL MEDICINE

## 2020-10-05 RX ORDER — IBUPROFEN 200 MG
200 TABLET ORAL
COMMUNITY
End: 2020-11-20

## 2020-10-05 NOTE — PROGRESS NOTES
Eden Simms is a 80 y.o. female who was seen by synchronous (real-time) audio-video technology on 10/5/2020 for Follow-up Patient is here is for follow-up she has seen ophthalmologist Hiral Steven He suspected scleritis patient has been taking ibuprofen 200 mg twice daily liver function has been checked is normal kidney function. Patient requesting paperwork to be completed for  patient has been reviewed with her caregiver patient has been stable no other changes Assessment & Plan:  
Diagnoses and all orders for this visit: 1. Vascular dementia without behavioral disturbance ([facility] [address] 2. Essential hypertension 3. Episcleritis of right eye 4. Ptosis of right eyelid [address] Subjective:  
 
 
Prior to Admission medications Medication Sig Start Date End Date Taking? Authorizing Provider  
ibuprofen (MOTRIN) 200 mg tablet Take 200 mg by mouth two (2) times daily (after meals). Yes Provider, Historical  
rivastigmine (EXELON) 4.6 mg/24 hr patch 1 Patch by TransDERmal route daily. Yes Provider, Historical  
amLODIPine (NORVASC) 5 mg tablet Take 1 Tab by mouth daily. 8/6/20  Yes Allen Morales MD  
acetaminophen (Tylenol Extra Strength) 500 mg tablet Take  by mouth every six (6) hours as needed for Pain. Yes Provider, Historical  
acetaminophen (TYLENOL ARTHRITIS PO) Take 500 mg by mouth as needed for Cough, Diarrhea, Fever, Nausea or Pain. Provider, Historical  
 
Patient Active Problem List  
Diagnosis Code  Mixed hyperlipidemia E78.2  Dementia without behavioral disturbance (HCC) F03.90  Left hip pain M25.552  
 History of left hip replacement Z96.642  Systolic hypertension T00  
 Essential hypertension I10 Patient Active Problem List  
 Diagnosis Date Noted  Essential hypertension 09/16/2019  Left hip pain 05/20/2019  
 History of left hip replacement 05/20/2019  Systolic hypertension 19/50/5386  Mixed hyperlipidemia 05/11/2019  [facility] 05/11/2019 Current Outpatient Medications Medication Sig Dispense Refill  ibuprofen (MOTRIN) 200 mg tablet Take 200 mg by mouth two (2) times daily (after meals).  rivastigmine (EXELON) 4.6 mg/24 hr patch 1 Patch by TransDERmal route daily.  amLODIPine (NORVASC) 5 mg tablet Take 1 Tab by mouth daily. 90 Tab 1  
 acetaminophen (Tylenol Extra Strength) 500 mg tablet Take  by mouth every six (6) hours as needed for Pain.  acetaminophen (TYLENOL ARTHRITIS PO) Take 500 mg by mouth as needed for Cough, Diarrhea, Fever, Nausea or Pain. No Known Allergies Past Medical History:  
Diagnosis Date  Arthritis  Hypertension Past Surgical History:  
Procedure Laterality Date  HX HIP REPLACEMENT Family History Problem Relation Age of Onset  No Known Problems Mother  No Known Problems Father Social History Tobacco Use  Smoking status: Never Smoker  Smokeless tobacco: Never Used Substance Use Topics  Alcohol use: Not Currently ROS Objective:  
 
Patient-Reported Vitals 6/15/2020 Patient-Reported Temperature 98 Patient-Reported Systolic  902 Patient-Reported Diastolic 78 General: alert, cooperative, no distress Mental  status: normal mood, behavior, speech, dress, motor activity, and thought processes, able to follow commands HENT: NCAT Neck: no visualized mass Resp: no respiratory distress Neuro: no gross deficits Skin: no discoloration or lesions of concern on visible areas Psychiatric: normal affect, consistent with stated mood, no evidence of hallucinations Additional exam findings: We discussed the expected course, resolution and complications of the diagnosis(es) in detail. Medication risks, benefits, costs, interactions, and alternatives were discussed as indicated.   I advised her to contact the office if her condition worsens, changes or fails to improve as anticipated. She expressed understanding with the diagnosis(es) and plan. Ana Villatoro, who was evaluated through a patient-initiated, synchronous (real-time) audio-video encounter, and/or her healthcare decision maker, is aware that it is a billable service, with coverage as determined by her insurance carrier. She provided verbal consent to proceed: Yes, and patient identification was verified. It was conducted pursuant to the emergency declaration under the [address] [place] [address], [address] [place] [address] authority and the [employer] Act. A caregiver was present when appropriate. Ability to conduct physical exam was limited. I was at home. The patient was at home.  
 
 
Karol Harding MD

## 2020-10-28 ENCOUNTER — PATIENT OUTREACH (OUTPATIENT)
Dept: CASE MANAGEMENT | Age: 85
End: 2020-10-28

## 2020-10-28 NOTE — PROGRESS NOTES
Social Work Note 
10/28/2020 Date of referral: 10/28/2020 Referral received from: ANUSHKA Reynolds Reason for referral: Family needing community resources for personal care aide. Previous SW Referral:  no If yes, brief summary and outcome:  n/a Support System: family Community Providers: PCP, [facility] 
 
Transportation: family Jamie Fabry Financial Concern: unknown Advance Care Plan:  reviewed and current Other: n/a Identified Needs:  
 
? Family seeking additional assistance to assist the patient with personal care. Social Work Plan: ? Assist the family with community resources for personal care aide to assist the patient in the home. MSW received referral to assist the patient's daughter with community resources for personal care aide assistance. The patient is a 80year old female who resides in the home with her daughter Karine Izquierdo. MSW contacted the patient's daughter who verified the patient's name and  as identifiers. MSW introduced self, role and reason for call. The patient's daughter reported that she is seeking personal care aide to assist with the patient in the home. She shared that the patient was assessed for Medicaid but does not qualify. The patient's daughter expressed that she thought the patient's Medicare insurance would cover the cost for a personal care aide. MSW educated the patient's daughter on home health vs personal care. The patient's daughter informed MSW that she is unable to pay out of pocket for the personal care aide. The patient currently receives home health services for wound on finger and she goes to outpatient therapy. The patient's daughter informed MSW that the patient attends a day program ([facility]) and mentioned that due to several infections, the patient has not been able to attend until she is cleared by her PCP.  She shared that the center is requesting a letter from the PCP once the patient is able to return. The patient's daughter expressed her gratitude. She had no other issues or concerns to share. There are no other needs identified at this time. Episode will be resolved. MSW will be available to assist for any future need.

## 2020-10-28 NOTE — PROGRESS NOTES
Spoke with Rosa Mcpherson, caregiver. She voiced patient recently had foot surgery and hit hand on table which got infected. Family voiced she is staying up all night to watch patient. Family would like additional assistance in the home to help patient. will refer to [facility] Bell for assistance.  Family aware of referral.

## 2020-11-20 ENCOUNTER — TELEPHONE (OUTPATIENT)
Dept: FAMILY MEDICINE CLINIC | Age: 85
End: 2020-11-20

## 2020-11-20 ENCOUNTER — VIRTUAL VISIT (OUTPATIENT)
Dept: FAMILY MEDICINE CLINIC | Age: 85
End: 2020-11-20
Payer: MEDICARE

## 2020-11-20 DIAGNOSIS — M25.561 CHRONIC PAIN OF RIGHT KNEE: Primary | ICD-10-CM

## 2020-11-20 DIAGNOSIS — G89.29 CHRONIC PAIN OF RIGHT KNEE: Primary | ICD-10-CM

## 2020-11-20 PROCEDURE — 1090F PRES/ABSN URINE INCON ASSESS: CPT | Performed by: LEGAL MEDICINE

## 2020-11-20 PROCEDURE — G8432 DEP SCR NOT DOC, RNG: HCPCS | Performed by: LEGAL MEDICINE

## 2020-11-20 PROCEDURE — 99213 OFFICE O/P EST LOW 20 MIN: CPT | Performed by: LEGAL MEDICINE

## 2020-11-20 PROCEDURE — G8428 CUR MEDS NOT DOCUMENT: HCPCS | Performed by: LEGAL MEDICINE

## 2020-11-20 PROCEDURE — 1101F PT FALLS ASSESS-DOCD LE1/YR: CPT | Performed by: LEGAL MEDICINE

## 2020-11-20 PROCEDURE — G0463 HOSPITAL OUTPT CLINIC VISIT: HCPCS | Performed by: LEGAL MEDICINE

## 2020-11-20 RX ORDER — LEG BRACE
EACH MISCELLANEOUS
Qty: 1 EACH | Refills: 0 | Status: SHIPPED | OUTPATIENT
Start: 2020-11-20 | End: 2021-01-01

## 2020-11-20 RX ORDER — CELECOXIB 100 MG/1
100 CAPSULE ORAL 2 TIMES DAILY
Qty: 14 CAP | Refills: 0 | Status: SHIPPED | OUTPATIENT
Start: 2020-11-20 | End: 2021-01-01

## 2020-11-20 NOTE — PROGRESS NOTES
Compa Tineo is a 80 y.o. female who was seen by synchronous (real-time) audio-video technology on 11/20/2020 for No chief complaint on file. Right leg pain for on week not constant on and off no aggravating factors not relieved by tylenol, no swelling Pain also reported by home health care ,also with care giver , pain is mainly in the right knee radiating down her leg no numbness , caregiver did not notice swelling and also showed me both knees look the same no significant swelling   But there is tenderness in the lateral knee aspect. Assessment & Plan:  
Diagnoses and all orders for this visit: 
 
1. Chronic pain of right knee Advised to wear knee brace during the day Possible mensicus or ligament pain If it persist consider orthopedic referral for steroid injection -     Leg Brace (Knee Brace Large-XLarge) misc; by Does Not Apply route. -     celecoxib (CELEBREX) 100 mg capsule; Take 1 Cap by mouth two (2) times a day for 7 days. [address] 30 Subjective:  
 
 
Prior to Admission medications Medication Sig Start Date End Date Taking? Authorizing Provider Leg Brace (Knee Brace Large-XLarge) misc by Does Not Apply route. 11/20/20  Yes Ninfa Ayala MD  
celecoxib (CELEBREX) 100 mg capsule Take 1 Cap by mouth two (2) times a day for 7 days. 11/20/20 11/27/20 Yes Ninfa Ayala MD  
rivastigmine (EXELON) 4.6 mg/24 hr patch 1 Patch by TransDERmal route daily. Yes Provider, Historical  
amLODIPine (NORVASC) 5 mg tablet Take 1 Tab by mouth daily. 8/6/20  Yes Ninfa Ayala MD  
acetaminophen (Tylenol Extra Strength) 500 mg tablet Take  by mouth every six (6) hours as needed for Pain. Yes Provider, Historical  
acetaminophen (TYLENOL ARTHRITIS PO) Take 500 mg by mouth as needed for Cough, Diarrhea, Fever, Nausea or Pain. Yes Provider, Historical  
 
Patient Active Problem List  
Diagnosis Code  Mixed hyperlipidemia E78.2  Dementia without behavioral disturbance ([facility]) F03.90  Left hip pain M25.552  
 History of left hip replacement Z96.642  Systolic hypertension N25  
 Essential hypertension I10 Patient Active Problem List  
 Diagnosis Date Noted  Essential hypertension 09/16/2019  Left hip pain 05/20/2019  
 History of left hip replacement 05/20/2019  Systolic hypertension 42/03/8942  Mixed hyperlipidemia 05/11/2019  Dementia without behavioral disturbance ([facility] [address] 05/11/2019 Current Outpatient Medications Medication Sig Dispense Refill  Leg Brace (Knee Brace Large-XLarge) misc by Does Not Apply route. 1 Each 0  
 celecoxib (CELEBREX) 100 mg capsule Take 1 Cap by mouth two (2) times a day for 7 days. 14 Cap 0  
 rivastigmine (EXELON) 4.6 mg/24 hr patch 1 Patch by TransDERmal route daily.  amLODIPine (NORVASC) 5 mg tablet Take 1 Tab by mouth daily. 90 Tab 1  
 acetaminophen (Tylenol Extra Strength) 500 mg tablet Take  by mouth every six (6) hours as needed for Pain.  acetaminophen (TYLENOL ARTHRITIS PO) Take 500 mg by mouth as needed for Cough, Diarrhea, Fever, Nausea or Pain. No Known Allergies Past Medical History:  
Diagnosis Date  Arthritis  Hypertension Past Surgical History:  
Procedure Laterality Date  HX HIP REPLACEMENT Family History Problem Relation Age of Onset  No Known Problems Mother  No Known Problems Father Social History Tobacco Use  Smoking status: Never Smoker  Smokeless tobacco: Never Used Substance Use Topics  Alcohol use: Not Currently Review of Systems Constitutional: Negative for chills, fever, malaise/fatigue and weight loss. HENT: Negative for congestion, ear discharge, ear pain, hearing loss, nosebleeds and sore throat. Eyes: Negative for blurred vision, double vision and discharge. Respiratory: Negative for cough, hemoptysis, sputum production, shortness of breath and wheezing. Cardiovascular: Negative for chest pain, palpitations, claudication and leg swelling. Gastrointestinal: Negative for abdominal pain, constipation, diarrhea, nausea and vomiting. Genitourinary: Negative for dysuria, frequency and urgency. Musculoskeletal: Positive for joint pain. Negative for back pain, myalgias and neck pain. Skin: Negative for itching and rash. Neurological: Negative for dizziness, tingling, sensory change, speech change, focal weakness, weakness and headaches. Objective:  
 
Patient-Reported Vitals 6/15/2020 Patient-Reported Temperature 98 Patient-Reported Systolic  113 Patient-Reported Diastolic 78 General: alert, cooperative, no distress Mental  status: normal mood, behavior, speech, dress, motor activity, and thought processes, able to follow commands HENT: NCAT Neck: no visualized mass Resp: no respiratory distress Neuro: no gross deficits Skin: no discoloration or lesions of concern on visible areas Psychiatric: normal affect, consistent with stated mood, no evidence of hallucinations Additional exam findings: We discussed the expected course, resolution and complications of the diagnosis(es) in detail. Medication risks, benefits, costs, interactions, and alternatives were discussed as indicated. I advised her to contact the office if her condition worsens, changes or fails to improve as anticipated. She expressed understanding with the diagnosis(es) and plan. Laurita Brown, who was evaluated through a patient-initiated, synchronous (real-time) audio-video encounter, and/or her healthcare decision maker, is aware that it is a billable service, with coverage as determined by her insurance carrier. She provided verbal consent to proceed: Yes, and patient identification was verified.  It was conducted pursuant to the emergency declaration under the [facility], [facility]5 waiver authority and the Coronavirus Preparedness and Response Supplemental Appropriations Act. A caregiver was present when appropriate. Ability to conduct physical exam was limited. I was in the office. The patient was at home.  
 
 
Jayden Ortiz MD

## 2020-11-20 NOTE — TELEPHONE ENCOUNTER
Mika Marley from home health is calling in regard to the pt having RT leg pain (nerve pain & sciatica they are asking for gabapentin because tylenol is currently not helping the pt with pain.  Please call nurse Mika Marley for any further questions

## 2020-11-20 NOTE — TELEPHONE ENCOUNTER
Please advise, thank you. Spoke with Shad Alexis to maybe patient needing an appointment, she advise maybe a VV would be better because her family is not taking out very much due to her age. She stated that nothing is working for her pain.

## 2021-01-01 ENCOUNTER — HOME CARE VISIT (OUTPATIENT)
Dept: SCHEDULING | Facility: HOME HEALTH | Age: 86
End: 2021-01-01
Payer: MEDICARE

## 2021-01-01 ENCOUNTER — HOME CARE VISIT (OUTPATIENT)
Dept: HOSPICE | Facility: HOSPICE | Age: 86
End: 2021-01-01
Payer: MEDICARE

## 2021-01-01 ENCOUNTER — OFFICE VISIT (OUTPATIENT)
Dept: FAMILY MEDICINE CLINIC | Age: 86
End: 2021-01-01
Payer: MEDICARE

## 2021-01-01 ENCOUNTER — PATIENT OUTREACH (OUTPATIENT)
Dept: CASE MANAGEMENT | Age: 86
End: 2021-01-01

## 2021-01-01 ENCOUNTER — IMMUNIZATION (OUTPATIENT)
Dept: INTERNAL MEDICINE CLINIC | Age: 86
End: 2021-01-01
Payer: MEDICARE

## 2021-01-01 ENCOUNTER — TELEPHONE (OUTPATIENT)
Dept: FAMILY MEDICINE CLINIC | Age: 86
End: 2021-01-01

## 2021-01-01 ENCOUNTER — HOSPICE ADMISSION (OUTPATIENT)
Dept: HOSPICE | Facility: HOSPICE | Age: 86
End: 2021-01-01
Payer: MEDICARE

## 2021-01-01 ENCOUNTER — HOSPITAL ENCOUNTER (OUTPATIENT)
Dept: LAB | Age: 86
Discharge: HOME | End: 2021-03-03
Payer: MEDICARE

## 2021-01-01 ENCOUNTER — HOSPICE CERTIFICATION ENCOUNTER (OUTPATIENT)
Dept: HOSPICE | Age: 86
End: 2021-01-01

## 2021-01-01 ENCOUNTER — APPOINTMENT (OUTPATIENT)
Dept: FAMILY MEDICINE CLINIC | Age: 86
End: 2021-01-01

## 2021-01-01 ENCOUNTER — DOCUMENTATION ONLY (OUTPATIENT)
Dept: INTERNAL MEDICINE CLINIC | Age: 86
End: 2021-01-01

## 2021-01-01 VITALS
TEMPERATURE: 99.8 F | HEART RATE: 87 BPM | SYSTOLIC BLOOD PRESSURE: 114 MMHG | RESPIRATION RATE: 18 BRPM | DIASTOLIC BLOOD PRESSURE: 78 MMHG

## 2021-01-01 VITALS
TEMPERATURE: 98.2 F | BODY MASS INDEX: 24.74 KG/M2 | SYSTOLIC BLOOD PRESSURE: 149 MMHG | HEART RATE: 74 BPM | WEIGHT: 126 LBS | HEIGHT: 60 IN | RESPIRATION RATE: 16 BRPM | DIASTOLIC BLOOD PRESSURE: 76 MMHG | OXYGEN SATURATION: 99 %

## 2021-01-01 VITALS
DIASTOLIC BLOOD PRESSURE: 60 MMHG | HEART RATE: 78 BPM | OXYGEN SATURATION: 93 % | TEMPERATURE: 98.2 F | RESPIRATION RATE: 16 BRPM | SYSTOLIC BLOOD PRESSURE: 100 MMHG

## 2021-01-01 VITALS
TEMPERATURE: 98.3 F | DIASTOLIC BLOOD PRESSURE: 62 MMHG | HEART RATE: 90 BPM | OXYGEN SATURATION: 94 % | SYSTOLIC BLOOD PRESSURE: 100 MMHG | RESPIRATION RATE: 16 BRPM

## 2021-01-01 VITALS
HEIGHT: 60 IN | HEART RATE: 78 BPM | DIASTOLIC BLOOD PRESSURE: 62 MMHG | WEIGHT: 131 LBS | BODY MASS INDEX: 25.72 KG/M2 | SYSTOLIC BLOOD PRESSURE: 138 MMHG | RESPIRATION RATE: 16 BRPM | TEMPERATURE: 97.7 F | OXYGEN SATURATION: 99 %

## 2021-01-01 VITALS
RESPIRATION RATE: 18 BRPM | OXYGEN SATURATION: 99 % | DIASTOLIC BLOOD PRESSURE: 82 MMHG | HEART RATE: 102 BPM | TEMPERATURE: 97 F | SYSTOLIC BLOOD PRESSURE: 139 MMHG

## 2021-01-01 VITALS
DIASTOLIC BLOOD PRESSURE: 62 MMHG | HEART RATE: 90 BPM | SYSTOLIC BLOOD PRESSURE: 100 MMHG | OXYGEN SATURATION: 81 % | RESPIRATION RATE: 16 BRPM | TEMPERATURE: 98.3 F

## 2021-01-01 VITALS — OXYGEN SATURATION: 97 % | HEART RATE: 80 BPM | RESPIRATION RATE: 16 BRPM | TEMPERATURE: 98.2 F

## 2021-01-01 VITALS
BODY MASS INDEX: 23.16 KG/M2 | TEMPERATURE: 97.5 F | SYSTOLIC BLOOD PRESSURE: 134 MMHG | OXYGEN SATURATION: 100 % | HEART RATE: 66 BPM | WEIGHT: 118 LBS | RESPIRATION RATE: 16 BRPM | DIASTOLIC BLOOD PRESSURE: 80 MMHG | HEIGHT: 60 IN

## 2021-01-01 VITALS
HEART RATE: 78 BPM | SYSTOLIC BLOOD PRESSURE: 100 MMHG | RESPIRATION RATE: 16 BRPM | OXYGEN SATURATION: 97 % | TEMPERATURE: 98.2 F | DIASTOLIC BLOOD PRESSURE: 62 MMHG

## 2021-01-01 VITALS
OXYGEN SATURATION: 91 % | DIASTOLIC BLOOD PRESSURE: 62 MMHG | HEART RATE: 80 BPM | RESPIRATION RATE: 14 BRPM | SYSTOLIC BLOOD PRESSURE: 100 MMHG | TEMPERATURE: 98.9 F

## 2021-01-01 VITALS — OXYGEN SATURATION: 82 % | RESPIRATION RATE: 20 BRPM | TEMPERATURE: 97.3 F | HEART RATE: 102 BPM

## 2021-01-01 VITALS — RESPIRATION RATE: 14 BRPM | HEART RATE: 80 BPM | OXYGEN SATURATION: 94 %

## 2021-01-01 VITALS
DIASTOLIC BLOOD PRESSURE: 72 MMHG | RESPIRATION RATE: 14 BRPM | HEART RATE: 80 BPM | TEMPERATURE: 98.4 F | OXYGEN SATURATION: 98 % | SYSTOLIC BLOOD PRESSURE: 100 MMHG

## 2021-01-01 VITALS
HEIGHT: 60 IN | BODY MASS INDEX: 24.11 KG/M2 | WEIGHT: 122.8 LBS | RESPIRATION RATE: 16 BRPM | OXYGEN SATURATION: 100 % | SYSTOLIC BLOOD PRESSURE: 130 MMHG | HEART RATE: 72 BPM | DIASTOLIC BLOOD PRESSURE: 76 MMHG | TEMPERATURE: 98.1 F

## 2021-01-01 VITALS — TEMPERATURE: 97.6 F

## 2021-01-01 VITALS — OXYGEN SATURATION: 94 % | TEMPERATURE: 98.3 F | RESPIRATION RATE: 16 BRPM

## 2021-01-01 DIAGNOSIS — R10.30 LOWER ABDOMINAL PAIN: Primary | ICD-10-CM

## 2021-01-01 DIAGNOSIS — Z23 ENCOUNTER FOR ADMINISTRATION OF VACCINE: ICD-10-CM

## 2021-01-01 DIAGNOSIS — F01.50 VASCULAR DEMENTIA WITHOUT BEHAVIORAL DISTURBANCE (HCC): Primary | ICD-10-CM

## 2021-01-01 DIAGNOSIS — R82.90 ABNORMAL URINALYSIS: ICD-10-CM

## 2021-01-01 DIAGNOSIS — I10 ESSENTIAL HYPERTENSION: ICD-10-CM

## 2021-01-01 DIAGNOSIS — Z23 NEEDS FLU SHOT: ICD-10-CM

## 2021-01-01 DIAGNOSIS — M25.561 CHRONIC PAIN OF RIGHT KNEE: ICD-10-CM

## 2021-01-01 DIAGNOSIS — Z23 ENCOUNTER FOR IMMUNIZATION: Primary | ICD-10-CM

## 2021-01-01 DIAGNOSIS — R10.30 LOWER ABDOMINAL PAIN: ICD-10-CM

## 2021-01-01 DIAGNOSIS — G89.29 CHRONIC PAIN OF RIGHT KNEE: ICD-10-CM

## 2021-01-01 DIAGNOSIS — L84 CALLUS OF FOOT: ICD-10-CM

## 2021-01-01 DIAGNOSIS — F01.50 VASCULAR DEMENTIA WITHOUT BEHAVIORAL DISTURBANCE (HCC): ICD-10-CM

## 2021-01-01 DIAGNOSIS — R55 SYNCOPE, UNSPECIFIED SYNCOPE TYPE: ICD-10-CM

## 2021-01-01 DIAGNOSIS — R26.89 FUNCTIONAL GAIT ABNORMALITY: ICD-10-CM

## 2021-01-01 DIAGNOSIS — F03.90 SENILE DEMENTIA WITHOUT BEHAVIORAL DISTURBANCE (HCC): ICD-10-CM

## 2021-01-01 DIAGNOSIS — E78.2 MIXED HYPERLIPIDEMIA: ICD-10-CM

## 2021-01-01 DIAGNOSIS — H15.101 EPISCLERITIS OF RIGHT EYE: ICD-10-CM

## 2021-01-01 DIAGNOSIS — H46.9 OPTIC NEUROPATHY: ICD-10-CM

## 2021-01-01 DIAGNOSIS — R63.0 POOR APPETITE: Primary | ICD-10-CM

## 2021-01-01 DIAGNOSIS — H02.401 PTOSIS OF RIGHT EYELID: ICD-10-CM

## 2021-01-01 DIAGNOSIS — N39.46 MIXED STRESS AND URGE URINARY INCONTINENCE: ICD-10-CM

## 2021-01-01 DIAGNOSIS — H02.401 DROOPY EYELID, RIGHT: ICD-10-CM

## 2021-01-01 LAB
ALBUMIN SERPL-MCNC: 3.4 G/DL (ref 3.4–5)
ALBUMIN/GLOB SERPL: 0.9 {RATIO} (ref 0.8–1.7)
ALP SERPL-CCNC: 84 U/L (ref 45–117)
ALT SERPL-CCNC: 20 U/L (ref 13–56)
ANION GAP SERPL CALC-SCNC: 4 MMOL/L (ref 3–18)
AST SERPL-CCNC: 17 U/L (ref 10–38)
BACTERIA SPEC CULT: NORMAL
BASOPHILS # BLD: 0 K/UL (ref 0–0.1)
BASOPHILS NFR BLD: 0 % (ref 0–2)
BILIRUB SERPL-MCNC: 0.3 MG/DL (ref 0.2–1)
BILIRUB UR QL STRIP: NEGATIVE
BUN SERPL-MCNC: 14 MG/DL (ref 7–18)
BUN/CREAT SERPL: 14 (ref 12–20)
CALCIUM SERPL-MCNC: 8.7 MG/DL (ref 8.5–10.1)
CC UR VC: NORMAL
CHLORIDE SERPL-SCNC: 105 MMOL/L (ref 100–111)
CO2 SERPL-SCNC: 28 MMOL/L (ref 21–32)
CREAT SERPL-MCNC: 1.03 MG/DL (ref 0.6–1.3)
DIFFERENTIAL METHOD BLD: ABNORMAL
EOSINOPHIL # BLD: 0 K/UL (ref 0–0.4)
EOSINOPHIL NFR BLD: 1 % (ref 0–5)
ERYTHROCYTE [DISTWIDTH] IN BLOOD BY AUTOMATED COUNT: 14.7 % (ref 11.6–14.5)
GLOBULIN SER CALC-MCNC: 3.6 G/DL (ref 2–4)
GLUCOSE SERPL-MCNC: 96 MG/DL (ref 74–99)
GLUCOSE UR-MCNC: NEGATIVE MG/DL
HCT VFR BLD AUTO: 36.9 % (ref 35–45)
HGB BLD-MCNC: 11.7 G/DL (ref 12–16)
KETONES P FAST UR STRIP-MCNC: NEGATIVE MG/DL
LYMPHOCYTES # BLD: 0.9 K/UL (ref 0.9–3.6)
LYMPHOCYTES NFR BLD: 20 % (ref 21–52)
MCH RBC QN AUTO: 28.3 PG (ref 24–34)
MCHC RBC AUTO-ENTMCNC: 31.7 G/DL (ref 31–37)
MCV RBC AUTO: 89.3 FL (ref 74–97)
MONOCYTES # BLD: 0.2 K/UL (ref 0.05–1.2)
MONOCYTES NFR BLD: 4 % (ref 3–10)
NEUTS SEG # BLD: 3.3 K/UL (ref 1.8–8)
NEUTS SEG NFR BLD: 75 % (ref 40–73)
PH UR STRIP: 6 [PH] (ref 4.6–8)
PLATELET # BLD AUTO: 295 K/UL (ref 135–420)
PMV BLD AUTO: 11 FL (ref 9.2–11.8)
POTASSIUM SERPL-SCNC: 4.1 MMOL/L (ref 3.5–5.5)
PROT SERPL-MCNC: 7 G/DL (ref 6.4–8.2)
PROT UR QL STRIP: NEGATIVE
RBC # BLD AUTO: 4.13 M/UL (ref 4.2–5.3)
SERVICE CMNT-IMP: NORMAL
SODIUM SERPL-SCNC: 137 MMOL/L (ref 136–145)
SP GR UR STRIP: 1.02 (ref 1–1.03)
UA UROBILINOGEN AMB POC: NORMAL (ref 0.2–1)
URINALYSIS CLARITY POC: CLEAR
URINALYSIS COLOR POC: YELLOW
URINE BLOOD POC: NORMAL
URINE LEUKOCYTES POC: NEGATIVE
URINE NITRITES POC: NEGATIVE
WBC # BLD AUTO: 4.4 K/UL (ref 4.6–13.2)

## 2021-01-01 PROCEDURE — 0651 HSPC ROUTINE HOME CARE

## 2021-01-01 PROCEDURE — G0299 HHS/HOSPICE OF RN EA 15 MIN: HCPCS

## 2021-01-01 PROCEDURE — G0156 HHCP-SVS OF AIDE,EA 15 MIN: HCPCS

## 2021-01-01 PROCEDURE — G0155 HHCP-SVS OF CSW,EA 15 MIN: HCPCS

## 2021-01-01 PROCEDURE — 80053 COMPREHEN METABOLIC PANEL: CPT

## 2021-01-01 PROCEDURE — HOSPICE MEDICATION HC HH HOSPICE MEDICATION

## 2021-01-01 PROCEDURE — 3336500001 HSPC ELECTION

## 2021-01-01 PROCEDURE — 99213 OFFICE O/P EST LOW 20 MIN: CPT | Performed by: LEGAL MEDICINE

## 2021-01-01 PROCEDURE — G8536 NO DOC ELDER MAL SCRN: HCPCS | Performed by: LEGAL MEDICINE

## 2021-01-01 PROCEDURE — 91301 COVID-19, MRNA, LNP-S, PF, 100MCG/0.5ML DOSE(MODERNA): CPT | Performed by: FAMILY MEDICINE

## 2021-01-01 PROCEDURE — G8510 SCR DEP NEG, NO PLAN REQD: HCPCS | Performed by: LEGAL MEDICINE

## 2021-01-01 PROCEDURE — 1101F PT FALLS ASSESS-DOCD LE1/YR: CPT | Performed by: LEGAL MEDICINE

## 2021-01-01 PROCEDURE — G8419 CALC BMI OUT NRM PARAM NOF/U: HCPCS | Performed by: LEGAL MEDICINE

## 2021-01-01 PROCEDURE — 0655 HSPC INPATIENT RESPITE

## 2021-01-01 PROCEDURE — G8420 CALC BMI NORM PARAMETERS: HCPCS | Performed by: LEGAL MEDICINE

## 2021-01-01 PROCEDURE — G8427 DOCREV CUR MEDS BY ELIG CLIN: HCPCS | Performed by: LEGAL MEDICINE

## 2021-01-01 PROCEDURE — 36415 COLL VENOUS BLD VENIPUNCTURE: CPT

## 2021-01-01 PROCEDURE — 90694 VACC AIIV4 NO PRSRV 0.5ML IM: CPT | Performed by: LEGAL MEDICINE

## 2021-01-01 PROCEDURE — 1090F PRES/ABSN URINE INCON ASSESS: CPT | Performed by: LEGAL MEDICINE

## 2021-01-01 PROCEDURE — G0008 ADMIN INFLUENZA VIRUS VAC: HCPCS | Performed by: LEGAL MEDICINE

## 2021-01-01 PROCEDURE — G8432 DEP SCR NOT DOC, RNG: HCPCS | Performed by: LEGAL MEDICINE

## 2021-01-01 PROCEDURE — 3331090004 HSPC SERVICE INTENSITY ADD-ON

## 2021-01-01 PROCEDURE — 87086 URINE CULTURE/COLONY COUNT: CPT

## 2021-01-01 PROCEDURE — G0463 HOSPITAL OUTPT CLINIC VISIT: HCPCS | Performed by: LEGAL MEDICINE

## 2021-01-01 PROCEDURE — 0011A COVID-19, MRNA, LNP-S, PF, 100MCG/0.5ML DOSE(MODERNA): CPT | Performed by: FAMILY MEDICINE

## 2021-01-01 PROCEDURE — 99214 OFFICE O/P EST MOD 30 MIN: CPT | Performed by: LEGAL MEDICINE

## 2021-01-01 PROCEDURE — 85025 COMPLETE CBC W/AUTO DIFF WBC: CPT

## 2021-01-01 PROCEDURE — 0012A COVID-19, MRNA, LNP-S, PF, 100MCG/0.5ML DOSE(MODERNA): CPT | Performed by: FAMILY MEDICINE

## 2021-01-01 PROCEDURE — 81003 URINALYSIS AUTO W/O SCOPE: CPT | Performed by: LEGAL MEDICINE

## 2021-01-01 RX ORDER — AMLODIPINE BESYLATE 5 MG/1
TABLET ORAL
Qty: 90 TABLET | Refills: 1 | Status: SHIPPED | OUTPATIENT
Start: 2021-01-01 | End: 2021-01-01

## 2021-01-01 RX ORDER — AMLODIPINE BESYLATE 5 MG/1
TABLET ORAL
Qty: 90 TAB | Refills: 1 | Status: SHIPPED | OUTPATIENT
Start: 2021-01-01 | End: 2021-01-01 | Stop reason: SDUPTHER

## 2021-01-01 RX ADMIN — LORAZEPAM 1 MG: 2 CONCENTRATE ORAL at 13:00

## 2021-02-08 NOTE — PROGRESS NOTES
Returned call to Rahel (listed on PHI). Ms. Placido Golden asked if writer thought it safe for patient to receive COVID-19 vaccine. Writer advised MsDiamond Placido Golden to contact PCP regarding concerns with patient receiving vaccine. Ms. Placido Golden asked if [employer] was offering vaccines to the public. Informed Ms. Placido Golden not at this time and advised her to contact her local health dept. Writer was thanked and call ended.

## 2021-03-03 NOTE — PROGRESS NOTES
Anish Cote is a 80 y.o. female (: 1921) presenting to address: Chief Complaint Patient presents with  Abdominal Pain Right side Vitals:  
 21 1109 BP: 138/62 Pulse: 78 Resp: 16 Temp: 97.7 °F (36.5 °C) TempSrc: Temporal  
SpO2: 99% Weight: 131 lb (59.4 kg) Height: 5' (1.524 m) Hearing/Vision: No exam data present Learning Assessment:  
 
Learning Assessment 2019 PRIMARY LEARNER Patient PRIMARY LANGUAGE ENGLISH  
LEARNER PREFERENCE PRIMARY VIDEOS  
  PICTURES  
  DEMONSTRATION  
ANSWERED BY patient RELATIONSHIP SELF Depression Screening:  
 
3 most recent PHQ Screens 3/3/2021 Little interest or pleasure in doing things Not at all Feeling down, depressed, irritable, or hopeless Not at all Total Score PHQ 2 0 Fall Risk Assessment:  
 
Fall Risk Assessment, last 12 mths 3/3/2021 Able to walk? Yes Fall in past 12 months? 0 Do you feel unsteady? 0 Are you worried about falling 0 Number of falls in past 12 months - Fall with injury? -  
 
Abuse Screening:  
 
Abuse Screening Questionnaire 3/3/2021 Do you ever feel afraid of your partner? Zulma Valles Are you in a relationship with someone who physically or mentally threatens you? Zulma Valles Is it safe for you to go home? Ihsan Lowers Coordination of Care Questionaire: 1. Have you been to the ER, urgent care clinic since your last visit? Hospitalized since your last visit? NO 
 
2. Have you seen or consulted any other health care providers outside of the [address] [place] [address] since your last visit? Include any pap smears or colon screening. YES, Podiatrist 
 
Advanced Directive: 1. Do you have an Advanced Directive? YES 
 
2. Would you like information on Advanced Directives?  NO

## 2021-03-12 NOTE — PROGRESS NOTES
Arabella Smith is a 80 y.o. female who presents for routine immunizations. She denies any symptoms , reactions or allergies that would exclude them from being immunized today. Risks and adverse reactions were discussed and the VIS was given to them. All questions were addressed. She was observed for 15 min post injection. There were no reactions observed. Juliet DEVLIN  [facility] MED[facility], N

## 2021-03-15 NOTE — TELEPHONE ENCOUNTER
Spoke with Daughter and she stated Mrs. Soha Perez is still complaining of  The side pain. She wants to know if there any more testing that she should do? Also, she stated yesterday she ate part of a catous plant and she says she seems to be doing okay but not sure should she been seen. You don't have any appointments until April. Please advise, thank you.

## 2021-03-22 NOTE — PROGRESS NOTES
ACM was assigned patient for Case management ACM reviewed EMR. Noted patient was seen in [facility] 3/16-18/2021 Dementia Discharged to [facility]. LTC  Risk for Admission or ED Visit high  For questions contact Cindi Levine. Christy Warren RN,ACM This Complex Case Management Episode is closed.

## 2021-04-09 NOTE — PROGRESS NOTES
Zuly Contreras Chief Complaint Patient presents with  
[facility] Follow Up Vitals:  
 04/09/21 1306 BP: (!) 149/76 Pulse: 74 Resp: 16 Temp: 98.2 °F (36.8 °C) TempSrc: Temporal  
SpO2: 99% Weight: 126 lb (57.2 kg) Height: 5' (1.524 m) HPI: Patient  is here for follow after ER visit and she was in SNF for about 20 days , she has been home for 3 days No acute changes , has been experiencing  syncope episodes ? Siezures before admission to the hospital ,since discharged home she has not had any , he niece who is the legal guardian is worried and would like to purse investigations , I have discussed with her niece in length regarding benefits versus harms and investigation at this time, if episodes of syncope recurs she can go ahead and schedule appointment with neurologist, also syncope can be possibly due to cardiac arrhythmia, and patient would need to wear event monitor, her niece stated that she will not keep the monitor on, and she will take it off. Patient is a DNR Patient not able to provide any history all history was taken from her niece Right eye discomfort and had seen ophthalmology following up with narinder  at [facility]   
 
 as well as [facility] ER reports 3/17/2021, 1:47 PM - speaking with CC 
-rapid COVID 
-CXR screen (r/o mass for TB), not suspected, for SNF Niece reports some shaking activity. In July of 2020 she was admitting for r/o TIA, seizures, syncope. W/u grossly normal. She has vascular dementia and no evidence of syncope here, no respiratory distress here, no evidence of seizures here. Patient is stable for SNF and can continue to follow-up as outpatient. Given her pretty significant vascular dementia and the fact that she is 80years old, I am not sure how much she really benefits from invasive further work-up.   
 
 
Past Medical History:  
Diagnosis Date  Arthritis  Dementia ([facility] [address]  Hyperlipidemia  Hypertension Past Surgical History:  
Procedure Laterality Date  HX HIP REPLACEMENT Social History Tobacco Use  Smoking status: Never Smoker  Smokeless tobacco: Never Used Substance Use Topics  Alcohol use: Not Currently Family History Problem Relation Age of Onset  No Known Problems Mother  No Known Problems Father Review of Systems Constitutional: Negative for chills, fever, malaise/fatigue and weight loss. HENT: Negative for congestion, ear discharge, ear pain, hearing loss and nosebleeds. Eyes: Negative for blurred vision, double vision and discharge. Respiratory: Negative for cough, hemoptysis, sputum production, shortness of breath and wheezing. Cardiovascular: Negative for chest pain, palpitations, claudication and leg swelling. Gastrointestinal: Positive for abdominal pain and heartburn. Negative for constipation, diarrhea, nausea and vomiting. Genitourinary: Negative for dysuria, frequency and urgency. Musculoskeletal: Negative for myalgias. Skin: Negative for itching and rash. Neurological: Negative for weakness and headaches. Physical Exam 
Vitals signs and nursing note reviewed. Constitutional:   
   General: She is not in acute distress. Appearance: She is well-developed. She is not diaphoretic. HENT:  
   Head: Normocephalic and atraumatic. Eyes:  
   General: No scleral icterus. Right eye: Discharge present. Left eye: No discharge. Comments: Right eye ptosis and erytherma Neck:  
   Thyroid: No thyromegaly. Cardiovascular:  
   Rate and Rhythm: Normal rate and regular rhythm. Heart sounds: Normal heart sounds. Pulmonary:  
   Effort: Pulmonary effort is normal. No respiratory distress. Breath sounds: Normal breath sounds. No wheezing or rales. Chest:  
   Chest wall: No tenderness. Abdominal:  
   General: There is no distension. Palpations: Abdomen is soft. Tenderness: There is no abdominal tenderness. There is no rebound. Musculoskeletal:     
   General: No tenderness or deformity. Lymphadenopathy:  
   Cervical: No cervical adenopathy. Skin: 
   General: Skin is warm and dry. Coloration: Skin is not pale. Findings: No erythema or rash. Neurological:  
   Mental Status: She is alert. Cranial Nerves: No cranial nerve deficit. Motor: Weakness present. Coordination: Coordination normal.  
   Gait: Gait abnormal.  
   Comments:  
Conjunctival erythema of the right eye and ptosis Psychiatric:     
   Mood and Affect: Mood normal.     
   Behavior: Behavior normal.  
 
  
 
Assessment and plan  
 
Plan of care has been discussed with the patient, he agrees to the plan and verbalized understanding. All his questions were answered More than 50% of the time spent in this visit was counseling the patient about  illness and treatment options 1. Vascular dementia without behavioral disturbance ([facility] [address] - DO NOT RESUSCITATE 2. Syncope, unspecified syncope type 
 
- REFERRAL TO NEUROLOGY 3. Essential hypertension Current Outpatient Medications Medication Sig Dispense Refill  amLODIPine (NORVASC) 5 mg tablet TAKE 1 TABLET BY MOUTH DAILY 90 Tab 1  rivastigmine (EXELON) 4.6 mg/24 hr patch 1 Patch by TransDERmal route daily.  acetaminophen (Tylenol Extra Strength) 500 mg tablet Take  by mouth every six (6) hours as needed for Pain.  acetaminophen (TYLENOL ARTHRITIS PO) Take 500 mg by mouth as needed for Cough, Diarrhea, Fever, Nausea or Pain. Patient Active Problem List  
 Diagnosis Date Noted  Essential hypertension 09/16/2019  Left hip pain 05/20/2019  
 History of left hip replacement 05/20/2019  Systolic hypertension 02/74/4397  Mixed hyperlipidemia 05/11/2019  Dementia without behavioral disturbance ([facility] [address] 05/11/2019 Results for orders placed or performed during the hospital encounter of 03/03/21 CULTURE, URINE Specimen: Urine Result Value Ref Range Special Requests: NO SPECIAL REQUESTS [place] Count [id number] COLONIES/mL Culture result: MIXED UROGENITAL CITLALLI ISOLATED    
CBC WITH AUTOMATED DIFF Result Value Ref Range WBC 4.4 (L) 4.6 - 13.2 K/uL  
 RBC 4.13 (L) 4.20 - 5.30 M/uL  
 HGB 11.7 (L) 12.0 - 16.0 g/dL HCT 36.9 35.0 - 45.0 % MCV 89.3 74.0 - 97.0 FL  
 MCH 28.3 24.0 - 34.0 PG  
 MCHC 31.7 31.0 - 37.0 g/dL  
 RDW 14.7 (H) 11.6 - 14.5 % PLATELET 046 654 - 712 K/uL MPV 11.0 9.2 - 11.8 FL  
 NEUTROPHILS 75 (H) 40 - 73 % LYMPHOCYTES 20 (L) 21 - 52 % MONOCYTES 4 3 - 10 % EOSINOPHILS 1 0 - 5 % BASOPHILS 0 0 - 2 %  
 ABS. NEUTROPHILS 3.3 1.8 - 8.0 K/UL  
 ABS. LYMPHOCYTES 0.9 0.9 - 3.6 K/UL  
 ABS. MONOCYTES 0.2 0.05 - 1.2 K/UL  
 ABS. EOSINOPHILS 0.0 0.0 - 0.4 K/UL  
 ABS. BASOPHILS 0.0 0.0 - 0.1 K/UL  
 DF AUTOMATED METABOLIC PANEL, COMPREHENSIVE Result Value Ref Range Sodium 137 136 - 145 mmol/L Potassium 4.1 3.5 - 5.5 mmol/L Chloride 105 100 - 111 mmol/L  
 CO2 28 21 - 32 mmol/L Anion gap 4 3.0 - 18 mmol/L Glucose 96 74 - 99 mg/dL BUN 14 7.0 - 18 MG/DL Creatinine 1.03 0.6 - 1.3 MG/DL  
 BUN/Creatinine ratio 14 12 - 20 GFR est AA 60 (L) >60 ml/min/1.73m2 GFR est non-AA 49 (L) >60 ml/min/1.73m2 Calcium 8.7 8.5 - 10.1 MG/DL Bilirubin, total 0.3 0.2 - 1.0 MG/DL  
 ALT (SGPT) 20 13 - 56 U/L  
 AST (SGOT) 17 10 - 38 U/L Alk. phosphatase 84 45 - 117 U/L Protein, total 7.0 6.4 - 8.2 g/dL Albumin 3.4 3.4 - 5.0 g/dL Globulin 3.6 2.0 - 4.0 g/dL A-G Ratio 0.9 0.8 - 1.7 Hospital Outpatient Visit on 03/03/2021 Component Date Value Ref Range Status  WBC 03/03/2021 4.4* 4.6 - 13.2 K/uL Final  
 RBC 03/03/2021 4.13* 4.20 - 5.30 M/uL Final  
 HGB 03/03/2021 11.7* 12.0 - 16.0 g/dL Final  
 HCT 03/03/2021 36.9  35.0 - 45.0 % Final  
 MCV 03/03/2021 89.3  74.0 - 97.0 FL Final  
 MCH 03/03/2021 28.3  24.0 - 34.0 PG Final  
 MCHC 03/03/2021 31.7  31.0 - 37.0 g/dL Final  
 RDW 03/03/2021 14.7* 11.6 - 14.5 % Final  
 PLATELET 19/58/3851 693  135 - 420 K/uL Final  
 MPV 03/03/2021 11.0  9.2 - 11.8 FL Final  
 NEUTROPHILS 03/03/2021 75* 40 - 73 % Final  
 LYMPHOCYTES 03/03/2021 20* 21 - 52 % Final  
 MONOCYTES 03/03/2021 4  3 - 10 % Final  
 EOSINOPHILS 03/03/2021 1  0 - 5 % Final  
 BASOPHILS 03/03/2021 0  0 - 2 % Final  
 ABS. NEUTROPHILS 03/03/2021 3.3  1.8 - 8.0 K/UL Final  
 ABS. LYMPHOCYTES 03/03/2021 0.9  0.9 - 3.6 K/UL Final  
 ABS. MONOCYTES 03/03/2021 0.2  0.05 - 1.2 K/UL Final  
 ABS. EOSINOPHILS 03/03/2021 0.0  0.0 - 0.4 K/UL Final  
 ABS. BASOPHILS 03/03/2021 0.0  0.0 - 0.1 K/UL Final  
 DF 03/03/2021 AUTOMATED    Final  
 Sodium 03/03/2021 137  136 - 145 mmol/L Final  
 Potassium 03/03/2021 4.1  3.5 - 5.5 mmol/L Final  
 Chloride 03/03/2021 105  100 - 111 mmol/L Final  
 CO2 03/03/2021 28  21 - 32 mmol/L Final  
 Anion gap 03/03/2021 4  3.0 - 18 mmol/L Final  
 Glucose 03/03/2021 96  74 - 99 mg/dL Final  
 BUN 03/03/2021 14  7.0 - 18 MG/DL Final  
 Creatinine 03/03/2021 1.03  0.6 - 1.3 MG/DL Final  
 BUN/Creatinine ratio 03/03/2021 14  12 - 20   Final  
 GFR est AA 03/03/2021 60* >60 ml/min/1.73m2 Final  
 GFR est non-AA 03/03/2021 49* >60 ml/min/1.73m2 Final  
 Comment: (NOTE) Estimated GFR is calculated using the Modification of Diet in Renal  
Disease (MDRD) Study equation, reported for both  [facility] [place]) and non- Americans (GFRNA), and normalized to 1.73m2  
body surface area. The physician must decide which value applies to  
the patient. The MDRD study equation should only be used in  
individuals age 25 or older. It has not been validated for the  
following: pregnant women, patients with serious comorbid conditions,  
or on certain medications, or persons with extremes of body size,  
muscle mass, or nutritional status.  
  
 Calcium 03/03/2021 8.7  8.5 - 10.1 MG/DL Final  
 Bilirubin, total 03/03/2021 0.3  0.2 - 1.0 MG/DL Final  
 ALT (SGPT) 03/03/2021 20  13 - 56 U/L Final  
 AST (SGOT) 03/03/2021 17  10 - 38 U/L Final  
 Alk. phosphatase 03/03/2021 84  45 - 117 U/L Final  
 Protein, total 03/03/2021 7.0  6.4 - 8.2 g/dL Final  
 Albumin 03/03/2021 3.4  3.4 - 5.0 g/dL Final  
 Globulin 03/03/2021 3.6  2.0 - 4.0 g/dL Final  
 A-G Ratio 03/03/2021 0.9  0.8 - 1.7   Final  
 Special Requests: 03/03/2021 NO SPECIAL REQUESTS    Final  
 [place] Count 03/03/2021     Final  
                 Value:[id number] COLONIES/mL  Culture result: 03/03/2021 MIXED UROGENITAL CITLALLI ISOLATED    Final  
Office Visit on 03/03/2021 Component Date Value Ref Range Status  Color (UA POC) 03/03/2021 Yellow   Final  
 Clarity (UA POC) 03/03/2021 Clear   Final  
 Glucose (UA POC) 03/03/2021 Negative  Negative Final  
 Bilirubin (UA POC) 03/03/2021 Negative  Negative Final  
 Ketones (UA POC) 03/03/2021 Negative  Negative Final  
 Specific gravity (UA POC) 03/03/2021 1.025  1.001 - 1.035 Final  
 Blood (UA POC) 03/03/2021 Trace  Negative Final  
 pH (UA POC) 03/03/2021 6.0  4.6 - 8.0 Final  
 Protein (UA POC) 03/03/2021 Negative  Negative Final  
 Urobilinogen (UA POC) 03/03/2021 0.2 mg/dL  0.2 - 1 Final  
 Nitrites (UA POC) 03/03/2021 Negative  Negative Final  
 Leukocyte esterase (UA POC) 03/03/2021 Negative  Negative Final  
  
 
 
Follow-up and Dispositions · Return in about 3 months (around 7/9/2021), or if symptoms worsen or fail to improve.

## 2021-04-09 NOTE — PROGRESS NOTES
Eric Lyn is a 80 y.o. female (: 1921) presenting to address: Chief Complaint Patient presents with  
[facility] Follow Up Vitals:  
 21 1306 BP: (!) 149/76 Pulse: 74 Resp: 16 Temp: 98.2 °F (36.8 °C) TempSrc: Temporal  
SpO2: 99% Weight: 126 lb (57.2 kg) Height: 5' (1.524 m) Hearing/Vision: No exam data present Learning Assessment:  
 
Learning Assessment 2019 PRIMARY LEARNER Patient PRIMARY LANGUAGE ENGLISH  
LEARNER PREFERENCE PRIMARY VIDEOS  
  PICTURES  
  DEMONSTRATION  
ANSWERED BY patient RELATIONSHIP SELF Depression Screening:  
 
3 most recent PHQ Screens 2021 Little interest or pleasure in doing things Not at all Feeling down, depressed, irritable, or hopeless Not at all Total Score PHQ 2 0 Fall Risk Assessment:  
 
Fall Risk Assessment, last 12 mths 2021 Able to walk? Yes Fall in past 12 months? 0 Do you feel unsteady? -  
Are you worried about falling - Number of falls in past 12 months - Fall with injury? -  
 
Abuse Screening:  
 
Abuse Screening Questionnaire 2021 Do you ever feel afraid of your partner? Piper Fitch Are you in a relationship with someone who physically or mentally threatens you? Piper Fitch Is it safe for you to go home? Lysbeth List Coordination of Care Questionaire: 1. Have you been to the ER, urgent care clinic since your last visit? Hospitalized since your last visit? YES 
 
2. Have you seen or consulted any other health care providers outside of the [address] [place] [address] since your last visit? Include any pap smears or colon screening. YES, Eye doctor Advanced Directive: 1. Do you have an Advanced Directive? YES 
 
2. Would you like information on Advanced Directives?  NO

## 2021-04-14 NOTE — TELEPHONE ENCOUNTER
Physical therapy home health called. Pt will be seen 2x a week for 4 weeks effective today. Physical therapist will send over order form.

## 2021-06-10 NOTE — PROGRESS NOTES
Varsha Sauceda     Chief Complaint   Patient presents with    Head Pain     left lower head     Vitals:    06/10/21 1353 06/10/21 1431   BP: (!) 148/67 130/76   Pulse: 72    Resp: 16    Temp: 98.1 °F (36.7 °C)    TempSrc: Temporal    SpO2: 100%    Weight: 122 lb 12.8 oz (55.7 kg)    Height: 5' (1.524 m)          HPI: Patient brought in by caregiver concerned about neck pain, and has noticed back of head ?swelling , patient did not have any fall that she is aware of or weakness    Patient does not complain of any pain today she states she is doing well for an old lady , she seemed comfortable during her visit, she does have  dementia    Right eye tearing  And seeing opthalmology  Dr. Valerie Lora , he sent me a note with the patient caregiver that the patient has been struggling with ischemic optic neuropathy and he is getting my opinion regarding giving her low-dose steroid, I will discuss this with him on the phone call, I do not think patient need to be on steroids since she is not complaining about any pain at this time, if treatment is not curative. Past Medical History:   Diagnosis Date    Arthritis     Dementia ([address]     Hyperlipidemia     Hypertension      Past Surgical History:   Procedure Laterality Date    HX HIP REPLACEMENT       Social History     Tobacco Use    Smoking status: Never Smoker    Smokeless tobacco: Never Used   Substance Use Topics    Alcohol use: Not Currently       Family History   Problem Relation Age of Onset    No Known Problems Mother     No Known Problems Father        Review of Systems   Constitutional: Negative for chills, fever, malaise/fatigue and weight loss. HENT: Negative for congestion, ear discharge, ear pain, hearing loss and nosebleeds. Eyes: Negative for blurred vision, double vision and discharge. Respiratory: Negative for cough, hemoptysis, sputum production and shortness of breath.     Cardiovascular: Negative for chest pain, palpitations, claudication and leg swelling. Gastrointestinal: Negative for abdominal pain, constipation, diarrhea, nausea and vomiting. Genitourinary: Negative for frequency, hematuria and urgency. Musculoskeletal: Positive for joint pain and myalgias. Negative for back pain, falls and neck pain. Skin: Negative for itching and rash. Neurological: Negative for speech change, focal weakness, weakness and headaches. Psychiatric/Behavioral: Positive for memory loss. Negative for depression and suicidal ideas. Physical Exam  Vitals and nursing note reviewed. Exam conducted with a chaperone present. Constitutional:       General: She is not in acute distress. Appearance: She is well-developed. She is not diaphoretic. HENT:      Head: Normocephalic and atraumatic. Eyes:      Comments: Right eyes during erythematous with ptosis   Neck:      Thyroid: No thyromegaly. Cardiovascular:      Rate and Rhythm: Normal rate and regular rhythm. Heart sounds: Normal heart sounds. Pulmonary:      Effort: Pulmonary effort is normal. No respiratory distress. Breath sounds: Normal breath sounds. No wheezing or rales. Chest:      Chest wall: No tenderness. Abdominal:      General: There is no distension. Palpations: Abdomen is soft. Tenderness: There is no abdominal tenderness. There is no rebound. Musculoskeletal:         General: No tenderness or deformity. Normal range of motion. Lymphadenopathy:      Cervical: No cervical adenopathy. Skin:     General: Skin is warm and dry. Coloration: Skin is not pale. Findings: No erythema or rash. Neurological:      Mental Status: She is alert and oriented to person, place, and time. Cranial Nerves: No cranial nerve deficit. Coordination: Coordination normal.   Psychiatric:         Behavior: Behavior normal.         Thought Content:  Thought content normal.         Judgment: Judgment normal.          Assessment and plan     Plan of care has been discussed with the patient, he agrees to the plan and verbalized understanding. All his questions were answered  More than 50% of the time spent in this visit was counseling the patient about  illness and treatment options         1. Essential hypertension  Blood pressure is fairly controlled  - amLODIPine (NORVASC) 5 mg tablet; TAKE 1 TABLET BY MOUTH DAILY  Dispense: 90 Tablet; Refill: 1    2. 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 Chronic pain of right knee  Taking Tylenol as needed    4. Ptosis of right eyelid  Will be discussed with ophthalmologist    5. Functional gait abnormality  Stable    6. Mixed hyperlipidemia      Neck pain   Most likely muscular, advised he referred to a topical cream for muscle pain over-the-counter take Tylenol as needed and warm compression as needed    I have examined the back of the hand there is normal is only normal bony structure  I reassured. her caregiver who is her legal guardian     7. Callus of foot  Patient is following up with podiatry     8. Droopy eyelid, right      9. Optic neuropathy      Current Outpatient Medications   Medication Sig Dispense Refill    amLODIPine (NORVASC) 5 mg tablet TAKE 1 TABLET BY MOUTH DAILY 90 Tablet 1    rivastigmine (EXELON) 4.6 mg/24 hr patch 1 Patch by TransDERmal route daily.  acetaminophen (Tylenol Extra Strength) 500 mg tablet Take  by mouth every six (6) hours as needed for Pain.  acetaminophen (TYLENOL ARTHRITIS PO) Take 500 mg by mouth as needed for Cough, Diarrhea, Fever, Nausea or Pain.  (Patient not taking: Reported on 6/10/2021)         Patient Active Problem List    Diagnosis Date Noted    Essential hypertension 09/16/2019    Left hip pain 05/20/2019    History of left hip replacement 97/09/0395    Systolic hypertension 53/70/1828    Mixed hyperlipidemia 05/11/2019    Dementia without behavioral disturbance ([facility] [address] 05/11/2019     Results for orders placed or performed during the hospital encounter of 03/03/21   CULTURE, URINE    Specimen: Urine   Result Value Ref Range    Special Requests: NO SPECIAL REQUESTS      [place] Count [id number]  COLONIES/mL        Culture result: MIXED UROGENITAL CITLALLI ISOLATED     CBC WITH AUTOMATED DIFF   Result Value Ref Range    WBC 4.4 (L) 4.6 - 13.2 K/uL    RBC 4.13 (L) 4.20 - 5.30 M/uL    HGB 11.7 (L) 12.0 - 16.0 g/dL    HCT 36.9 35.0 - 45.0 %    MCV 89.3 74.0 - 97.0 FL    MCH 28.3 24.0 - 34.0 PG    MCHC 31.7 31.0 - 37.0 g/dL    RDW 14.7 (H) 11.6 - 14.5 %    PLATELET 001 673 - 840 K/uL    MPV 11.0 9.2 - 11.8 FL    NEUTROPHILS 75 (H) 40 - 73 %    LYMPHOCYTES 20 (L) 21 - 52 %    MONOCYTES 4 3 - 10 %    EOSINOPHILS 1 0 - 5 %    BASOPHILS 0 0 - 2 %    ABS. NEUTROPHILS 3.3 1.8 - 8.0 K/UL    ABS. LYMPHOCYTES 0.9 0.9 - 3.6 K/UL    ABS. MONOCYTES 0.2 0.05 - 1.2 K/UL    ABS. EOSINOPHILS 0.0 0.0 - 0.4 K/UL    ABS. BASOPHILS 0.0 0.0 - 0.1 K/UL    DF AUTOMATED     METABOLIC PANEL, COMPREHENSIVE   Result Value Ref Range    Sodium 137 136 - 145 mmol/L    Potassium 4.1 3.5 - 5.5 mmol/L    Chloride 105 100 - 111 mmol/L    CO2 28 21 - 32 mmol/L    Anion gap 4 3.0 - 18 mmol/L    Glucose 96 74 - 99 mg/dL    BUN 14 7.0 - 18 MG/DL    Creatinine 1.03 0.6 - 1.3 MG/DL    BUN/Creatinine ratio 14 12 - 20      GFR est AA 60 (L) >60 ml/min/1.73m2    GFR est non-AA 49 (L) >60 ml/min/1.73m2    Calcium 8.7 8.5 - 10.1 MG/DL    Bilirubin, total 0.3 0.2 - 1.0 MG/DL    ALT (SGPT) 20 13 - 56 U/L    AST (SGOT) 17 10 - 38 U/L    Alk. phosphatase 84 45 - 117 U/L    Protein, total 7.0 6.4 - 8.2 g/dL    Albumin 3.4 3.4 - 5.0 g/dL    Globulin 3.6 2.0 - 4.0 g/dL    A-G Ratio 0.9 0.8 - 1.7       No visits with results within 3 Month(s) from this visit.    Latest known visit with results is:   Hospital Outpatient Visit on 03/03/2021   Component Date Value Ref Range Status    WBC 03/03/2021 4.4* 4.6 - 13.2 K/uL Final    RBC 03/03/2021 4.13* 4.20 - 5.30 M/uL Final    HGB 03/03/2021 11.7* 12.0 - 16.0 g/dL Final    HCT 03/03/2021 36.9  35.0 - 45.0 % Final    MCV 03/03/2021 89.3  74.0 - 97.0 FL Final    MCH 03/03/2021 28.3  24.0 - 34.0 PG Final    MCHC 03/03/2021 31.7  31.0 - 37.0 g/dL Final    RDW 03/03/2021 14.7* 11.6 - 14.5 % Final    PLATELET 16/95/7041 526  135 - 420 K/uL Final    MPV 03/03/2021 11.0  9.2 - 11.8 FL Final    NEUTROPHILS 03/03/2021 75* 40 - 73 % Final    LYMPHOCYTES 03/03/2021 20* 21 - 52 % Final    MONOCYTES 03/03/2021 4  3 - 10 % Final    EOSINOPHILS 03/03/2021 1  0 - 5 % Final    BASOPHILS 03/03/2021 0  0 - 2 % Final    ABS. NEUTROPHILS 03/03/2021 3.3  1.8 - 8.0 K/UL Final    ABS. LYMPHOCYTES 03/03/2021 0.9  0.9 - 3.6 K/UL Final    ABS. MONOCYTES 03/03/2021 0.2  0.05 - 1.2 K/UL Final    ABS. EOSINOPHILS 03/03/2021 0.0  0.0 - 0.4 K/UL Final    ABS. BASOPHILS 03/03/2021 0.0  0.0 - 0.1 K/UL Final    DF 03/03/2021 AUTOMATED    Final    Sodium 03/03/2021 137  136 - 145 mmol/L Final    Potassium 03/03/2021 4.1  3.5 - 5.5 mmol/L Final    Chloride 03/03/2021 105  100 - 111 mmol/L Final    CO2 03/03/2021 28  21 - 32 mmol/L Final    Anion gap 03/03/2021 4  3.0 - 18 mmol/L Final    Glucose 03/03/2021 96  74 - 99 mg/dL Final    BUN 03/03/2021 14  7.0 - 18 MG/DL Final    Creatinine 03/03/2021 1.03  0.6 - 1.3 MG/DL Final    BUN/Creatinine ratio 03/03/2021 14  12 - 20   Final    GFR est AA 03/03/2021 60* >60 ml/min/1.73m2 Final    GFR est non-AA 03/03/2021 49* >60 ml/min/1.73m2 Final    Comment: (NOTE)  Estimated GFR is calculated using the Modification of Diet in Renal   Disease (MDRD) Study equation, reported for both  Americans   (GFRAA) and non- Americans (GFRNA), and normalized to 1.73m2   body surface area. The physician must decide which value applies to   the patient. The MDRD study equation should only be used in   individuals age 25 or older.  It has not been validated for the   following: pregnant women, patients with serious comorbid conditions,   or on certain medications, or persons with extremes of body size,   muscle mass, or nutritional status.  Calcium 03/03/2021 8.7  8.5 - 10.1 MG/DL Final    Bilirubin, total 03/03/2021 0.3  0.2 - 1.0 MG/DL Final    ALT (SGPT) 03/03/2021 20  13 - 56 U/L Final    AST (SGOT) 03/03/2021 17  10 - 38 U/L Final    Alk. phosphatase 03/03/2021 84  45 - 117 U/L Final    Protein, total 03/03/2021 7.0  6.4 - 8.2 g/dL Final    Albumin 03/03/2021 3.4  3.4 - 5.0 g/dL Final    Globulin 03/03/2021 3.6  2.0 - 4.0 g/dL Final    A-G Ratio 03/03/2021 0.9  0.8 - 1.7   Final    Special Requests: 03/03/2021 NO SPECIAL REQUESTS    Final    [place] Count 03/03/2021     Final                    Value:[id number]  COLONIES/mL      Culture result: 03/03/2021 MIXED UROGENITAL CITLALLI ISOLATED    Final          Follow-up and Dispositions    · Return in about 3 months (around 9/10/2021).

## 2021-06-10 NOTE — PROGRESS NOTES
Eric Lyn is a 80 y.o. female (: 1921) presenting to address:    Chief Complaint   Patient presents with    Head Pain     left lower head       Vitals:    06/10/21 1353   BP: (!) 148/67   Pulse: 72   Resp: 16   Temp: 98.1 °F (36.7 °C)   TempSrc: Temporal   SpO2: 100%   Weight: 122 lb 12.8 oz (55.7 kg)   Height: 5' (1.524 m)       Is someone accompanying this pt? YES miriam     Is the patient using any DME equipment during OV? YES    Hearing/Vision:   No exam data present    Learning Assessment:     Learning Assessment 2019   PRIMARY LEARNER Patient   PRIMARY LANGUAGE ENGLISH   LEARNER PREFERENCE PRIMARY VIDEOS     PICTURES     DEMONSTRATION   ANSWERED BY patient   RELATIONSHIP SELF     Depression Screening:     3 most recent PHQ Screens 2021   Little interest or pleasure in doing things Not at all   Feeling down, depressed, irritable, or hopeless Not at all   Total Score PHQ 2 0     Fall Risk Assessment:     Fall Risk Assessment, last 12 mths 2021   Able to walk? Yes   Fall in past 12 months? 0   Do you feel unsteady? -   Are you worried about falling -   Number of falls in past 12 months -   Fall with injury? -     Coordination of Care Questionaire:   1. Have you been to the ER, urgent care clinic since your last visit? Hospitalized since your last visit? NO    2. Have you seen or consulted any other health care providers outside of the [address] [place] [address] since your last visit? Include any pap smears or colon screening. NO    Advanced Directive:   1. Do you have an Advanced Directive? YES    2. Would you like information on Advanced Directives?  NO

## 2021-08-03 PROBLEM — I10 ESSENTIAL HYPERTENSION: Status: RESOLVED | Noted: 2019-09-16 | Resolved: 2021-01-01

## 2021-09-23 NOTE — PROGRESS NOTES
Esau Bravo presents today for   Chief Complaint   Patient presents with    Side Pain     right     Urinary Pain     Visit Vitals  /80 (BP 1 Location: Right arm, BP Patient Position: Sitting, BP Cuff Size: Adult)   Pulse 66   Temp 97.5 °F (36.4 °C) (Temporal)   Resp 16   Ht 5' (1.524 m)   Wt 118 lb (53.5 kg)   SpO2 100%   BMI 23.05 kg/m²       Is someone accompanying this pt? Yes, Niece     Is the patient using any DME equipment during [address]? Yes,     Depression Screening:  3 most recent PHQ Screens 9/23/2021   Little interest or pleasure in doing things Not at all   Feeling down, depressed, irritable, or hopeless Not at all   Total Score PHQ 2 0       Learning Assessment:  Learning Assessment 9/16/2019   PRIMARY LEARNER Patient   PRIMARY LANGUAGE ENGLISH   LEARNER PREFERENCE PRIMARY VIDEOS     PICTURES     DEMONSTRATION   ANSWERED BY patient   RELATIONSHIP SELF       Travel Screening:    Travel Screening     Question   Response    In the last month, have you been in contact with someone who was confirmed or suspected to have [address] / COVID-19? No / Unsure    Have you had a COVID-19 viral test in the last 14 days? No    Do you have any of the following new or worsening symptoms? None of these    Have you traveled internationally or domestically in the last month? No      Travel History   Travel since 08/23/21     No documented travel since 08/23/21          Health Maintenance reviewed and discussed and ordered per Provider. Health Maintenance Due   Topic Date Due    DTaP/Tdap/Td series (1 - Tdap) Never done    Shingrix Vaccine Age 50> (1 of 2) Never done    Flu Vaccine (1) 09/01/2021    Medicare Yearly Exam  09/17/2021   . Coordination of Care:  1. Have you been to the ER, urgent care clinic since your last visit? Hospitalized since your last visit? No.    2. Have you seen or consulted any other health care providers outside of the [address] [place] [address] since your last visit? Include any pap smears or colon screening. No.     Immunization High dose Influenza administered 9/23/2021 by Tomeka Knapp/ Verified by Pasquale Dior. Patient tolerated procedure well. No reactions noted.

## 2021-09-23 NOTE — PROGRESS NOTES
Moy Gamboa     Chief Complaint   Patient presents with   [facility] Side Pain     right     Urinary Pain     Vitals:    09/23/21 1030   BP: 134/80   Pulse: 66   Resp: 16   Temp: 97.5 °F (36.4 °C)   TempSrc: Temporal   SpO2: 100%   Weight: 118 lb (53.5 kg)   Height: 5' (1.524 m)   PainSc:   6   PainLoc: Generalized         HPI:Pateint is here with her niece   She aid she is feeling good except her age   Her niece ( caregiver )  Worried that she is sleeping a lot not eating well ,ensure gave her diarrhea , and she had diarrhea all over the floor of the house and the couches she also have urinary incontinence she is wearing adult pull-ups, but occasionally she will take them out    She has seen the poor vision       Her niece does not want her to go to nursing home since that when the patient wish from the beginning and it is hard to take care of her, she does have help from nurses comes twice a week    Patient is a DNR  Since family is not looking into any aggressive treatment at this time consider hospice evaluation    Past Medical History:   Diagnosis Date    Arthritis     Dementia ([address]     Hyperlipidemia     Hypertension      Past Surgical History:   Procedure Laterality Date    HX HIP REPLACEMENT       Social History     Tobacco Use    Smoking status: Never Smoker    Smokeless tobacco: Never Used   Substance Use Topics    Alcohol use: Not Currently       Family History   Problem Relation Age of Onset    No Known Problems Mother     No Known Problems Father        Review of Systems   Constitutional: Positive for malaise/fatigue and weight loss. Negative for chills and fever. HENT: Positive for hearing loss. Negative for congestion, ear discharge, ear pain and nosebleeds. Eyes: Positive for blurred vision. Respiratory: Negative for cough and shortness of breath. Cardiovascular: Negative for chest pain, palpitations, claudication and leg swelling. Gastrointestinal: Positive for diarrhea.  Negative for abdominal pain, constipation, nausea and vomiting. Genitourinary: Positive for frequency and urgency. Negative for hematuria. Musculoskeletal: Negative for myalgias. Skin: Negative for itching and rash. Neurological: Positive for weakness. Negative for speech change and focal weakness. Physical Exam  Constitutional:       General: She is not in acute distress. Cardiovascular:      Rate and Rhythm: Normal rate and regular rhythm. Pulmonary:      Effort: Pulmonary effort is normal. No respiratory distress. Breath sounds: Normal breath sounds. No wheezing, rhonchi or rales. Abdominal:      General: Bowel sounds are normal. There is no distension. Tenderness: There is no abdominal tenderness. There is no guarding or rebound. Musculoskeletal:      Cervical back: Neck supple. No rigidity or tenderness. Neurological:      Mental Status: She is alert. Assessment and plan     Plan of care has been discussed with the patient, he agrees to the plan and verbalized understanding. All his questions were answered  More than 50% of the time spent in this visit was counseling the patient about  illness and treatment options         1. Poor appetite    Patient is eating 3 times a day. Eating less food she has lost weight since last visit    - REFERRAL TO HOSPICE    2. Senile dementia without behavioral disturbance ([facility] [address]    - REFERRAL TO HOSPICE    3. Needs flu shot  Flu vaccine was given with no complications  - FLU (FLUAD QUAD INFLUENZA VACCINE,QUAD,ADJUVANTED)    4. Mixed stress and urge urinary incontinence      5. Essential hypertension  Well-controlled on current medications  Current Outpatient Medications   Medication Sig Dispense Refill    amLODIPine (NORVASC) 5 mg tablet TAKE 1 TABLET BY MOUTH DAILY 90 Tablet 1    rivastigmine (EXELON) 4.6 mg/24 hr patch 1 Patch by TransDERmal route daily.       acetaminophen (Tylenol Extra Strength) 500 mg tablet Take  by mouth every six (6) hours as needed for Pain.  acetaminophen (TYLENOL ARTHRITIS PO) Take 500 mg by mouth as needed for Cough, Diarrhea, Fever, Nausea or Pain. (Patient not taking: Reported on 6/10/2021)         Patient Active Problem List    Diagnosis Date Noted    Left hip pain 05/20/2019    History of left hip replacement 54/25/1277    Systolic hypertension 92/01/7788    Mixed hyperlipidemia 05/11/2019    Dementia without behavioral disturbance ([facility] [address] 05/11/2019     Results for orders placed or performed during the hospital encounter of 03/03/21   CULTURE, URINE    Specimen: Urine   Result Value Ref Range    Special Requests: NO SPECIAL REQUESTS      [place] Count [id number]  COLONIES/mL        Culture result: MIXED UROGENITAL CITLALLI ISOLATED     CBC WITH AUTOMATED DIFF   Result Value Ref Range    WBC 4.4 (L) 4.6 - 13.2 K/uL    RBC 4.13 (L) 4.20 - 5.30 M/uL    HGB 11.7 (L) 12.0 - 16.0 g/dL    HCT 36.9 35.0 - 45.0 %    MCV 89.3 74.0 - 97.0 FL    MCH 28.3 24.0 - 34.0 PG    MCHC 31.7 31.0 - 37.0 g/dL    RDW 14.7 (H) 11.6 - 14.5 %    PLATELET 031 084 - 753 K/uL    MPV 11.0 9.2 - 11.8 FL    NEUTROPHILS 75 (H) 40 - 73 %    LYMPHOCYTES 20 (L) 21 - 52 %    MONOCYTES 4 3 - 10 %    EOSINOPHILS 1 0 - 5 %    BASOPHILS 0 0 - 2 %    ABS. NEUTROPHILS 3.3 1.8 - 8.0 K/UL    ABS. LYMPHOCYTES 0.9 0.9 - 3.6 K/UL    ABS. MONOCYTES 0.2 0.05 - 1.2 K/UL    ABS. EOSINOPHILS 0.0 0.0 - 0.4 K/UL    ABS.  BASOPHILS 0.0 0.0 - 0.1 K/UL    DF AUTOMATED     METABOLIC PANEL, COMPREHENSIVE   Result Value Ref Range    Sodium 137 136 - 145 mmol/L    Potassium 4.1 3.5 - 5.5 mmol/L    Chloride 105 100 - 111 mmol/L    CO2 28 21 - 32 mmol/L    Anion gap 4 3.0 - 18 mmol/L    Glucose 96 74 - 99 mg/dL    BUN 14 7.0 - 18 MG/DL    Creatinine 1.03 0.6 - 1.3 MG/DL    BUN/Creatinine ratio 14 12 - 20      GFR est AA 60 (L) >60 ml/min/1.73m2    GFR est non-AA 49 (L) >60 ml/min/1.73m2    Calcium 8.7 8.5 - 10.1 MG/DL    Bilirubin, total 0.3 0.2 - 1.0 MG/DL    ALT (SGPT) 20 13 - 56 U/L AST (SGOT) 17 10 - 38 U/L    Alk. phosphatase 84 45 - 117 U/L    Protein, total 7.0 6.4 - 8.2 g/dL    Albumin 3.4 3.4 - 5.0 g/dL    Globulin 3.6 2.0 - 4.0 g/dL    A-G Ratio 0.9 0.8 - 1.7       No visits with results within 3 Month(s) from this visit. Latest known visit with results is:   Hospital Outpatient Visit on 03/03/2021   Component Date Value Ref Range Status    WBC 03/03/2021 4.4* 4.6 - 13.2 K/uL Final    RBC 03/03/2021 4.13* 4.20 - 5.30 M/uL Final    HGB 03/03/2021 11.7* 12.0 - 16.0 g/dL Final    HCT 03/03/2021 36.9  35.0 - 45.0 % Final    MCV 03/03/2021 89.3  74.0 - 97.0 FL Final    MCH 03/03/2021 28.3  24.0 - 34.0 PG Final    MCHC 03/03/2021 31.7  31.0 - 37.0 g/dL Final    RDW 03/03/2021 14.7* 11.6 - 14.5 % Final    PLATELET 13/55/2495 490  135 - 420 K/uL Final    MPV 03/03/2021 11.0  9.2 - 11.8 FL Final    NEUTROPHILS 03/03/2021 75* 40 - 73 % Final    LYMPHOCYTES 03/03/2021 20* 21 - 52 % Final    MONOCYTES 03/03/2021 4  3 - 10 % Final    EOSINOPHILS 03/03/2021 1  0 - 5 % Final    BASOPHILS 03/03/2021 0  0 - 2 % Final    ABS. NEUTROPHILS 03/03/2021 3.3  1.8 - 8.0 K/UL Final    ABS. LYMPHOCYTES 03/03/2021 0.9  0.9 - 3.6 K/UL Final    ABS. MONOCYTES 03/03/2021 0.2  0.05 - 1.2 K/UL Final    ABS. EOSINOPHILS 03/03/2021 0.0  0.0 - 0.4 K/UL Final    ABS.  BASOPHILS 03/03/2021 0.0  0.0 - 0.1 K/UL Final    DF 03/03/2021 AUTOMATED    Final    Sodium 03/03/2021 137  136 - 145 mmol/L Final    Potassium 03/03/2021 4.1  3.5 - 5.5 mmol/L Final    Chloride 03/03/2021 105  100 - 111 mmol/L Final    CO2 03/03/2021 28  21 - 32 mmol/L Final    Anion gap 03/03/2021 4  3.0 - 18 mmol/L Final    Glucose 03/03/2021 96  74 - 99 mg/dL Final    BUN 03/03/2021 14  7.0 - 18 MG/DL Final    Creatinine 03/03/2021 1.03  0.6 - 1.3 MG/DL Final    BUN/Creatinine ratio 03/03/2021 14  12 - 20   Final    GFR est AA 03/03/2021 60* >60 ml/min/1.73m2 Final    GFR est non-AA 03/03/2021 49* >60 ml/min/1.73m2 Final    Comment: (NOTE)  Estimated GFR is calculated using the Modification of Diet in Renal   Disease (MDRD) Study equation, reported for both  Americans   (GFRAA) and non- Americans (GFRNA), and normalized to 1.73m2   body surface area. The physician must decide which value applies to   the patient. The MDRD study equation should only be used in   individuals age 25 or older. It has not been validated for the   following: pregnant women, patients with serious comorbid conditions,   or on certain medications, or persons with extremes of body size,   muscle mass, or nutritional status.  Calcium 03/03/2021 8.7  8.5 - 10.1 MG/DL Final    Bilirubin, total 03/03/2021 0.3  0.2 - 1.0 MG/DL Final    ALT (SGPT) 03/03/2021 20  13 - 56 U/L Final    AST (SGOT) 03/03/2021 17  10 - 38 U/L Final    Alk. phosphatase 03/03/2021 84  45 - 117 U/L Final    Protein, total 03/03/2021 7.0  6.4 - 8.2 g/dL Final    Albumin 03/03/2021 3.4  3.4 - 5.0 g/dL Final    Globulin 03/03/2021 3.6  2.0 - 4.0 g/dL Final    A-G Ratio 03/03/2021 0.9  0.8 - 1.7   Final    Special Requests: 03/03/2021 NO SPECIAL REQUESTS    Final    [place] Count 03/03/2021     Final                    Value:[id number]  COLONIES/mL      Culture result: 03/03/2021 MIXED UROGENITAL CITLALLI ISOLATED    Final          Follow-up and Dispositions    · Return if symptoms worsen or fail to improve.

## 2021-10-03 NOTE — HOSPICE
Hospice Admission Summary  /Mrs. 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 Patient has elected hospice services and is no longer seeking aggressive treatment. Co-morbidities related to the terminal diagnosis are DEBILITY. Patient also has a past medical history of ARTHRITIS, DEMENTIA, HYPERLIPIDEMIA, HYPERTENSION. The patient/family is present and willing and safely able to provide care and administer medications, their availability is daily 24/7  The patient/family participated in goal setting, care planning, and are agreeable to the care plan. Admission booklet reviewed with patient/family/caregiver; services provided under hospice benefit, review of rights and responsibilities, disposal of medications, contact information for , Joint Commission, Medicare, QIO, and outside resources for independence. The patient/family educated on IDT and their right to attend meetings. Education provided regarding 24-hour availability of hospice services and on-call number provided. Attending physician:  PENDING [id number]ي A Director:  [facility]  Level of Care:    Advance Directives:  YES IN THE HOME  Allergies:  NKA  MAC:  25  Height:  5'  Weight:  118LBS  PPS:  40%  FAST:    NYHA:  N/A   EF%:  N/A   Tobacco usage:  N/A,   Functional status:  WALKS TO BATHROOM WITH WALKER  Infection:  N/A   Pain:  mild GENERALIZED  -  medications include TYLENOL  Bowel Regimen:  BASELINE DIARRHEA  Lines, Drains, or Airways present:   NONE  Wounds present:  NONE  Symptoms to monitor to maintain comfort:  CALLUS BOTTOM OF LEFT FOOT, GENERALIZED PAIN  Hospice Aide Services Requested:  YES  MSW Requested:YES   Requested:YES  Volunteer Services Requested:  YES  Bereavement risk/contact:  LOW  Patient/family/caregiver specific end of life goal:  COMFORT  Training and education provided this visit:   [address]26 for next visit:  Ramiro Fontanez coordination with Medical Director, AIRAM, regarding admission to hospice and all are in agreement with plan of car    Patient moved her 3 years ago from [place] when her niece realized she was unable to care for herslf because of mental decline. at that time she did suffer a fall and fractured her hip. she did make a full recovery and now walks with a walker. she does at times c/o of pain under her left foot that the niece thinks is a callus. she sleeps most of the day. she did not appear short of breath but when she sat up she was making a grunting sound. the niece told her to stop and she did. the niece reports that she makes that sound  often when she is up but stops when asked. 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 she can only see shadows. the niece was told she can not have the test for 'blindness' because of her mental status. patient is also vey hard of hearing even with hearing aids in.  today before my arriva,l the niece reports that she had a 'spell'. apparently the patient on several occasions has had episodes of becoming unresponsive, sometimes with her eyes open. she sometimes looses control of her bowels/bladder. then she sweats profusely and vomits. these episodes last about 30 minutes then she sleeps. she has been evaluated for these episodes and the niece reports she was told maybe it is seizure or from dehydration. the patient was evaluated for heart abnomalities as a possible cause but nothing was found. she was suppose to have an EEG but it was never done. at my visit today patient was sleeping on her side in the bed and was fairly easily aroused.  with assistance she sat on the side of the bed. then she became very agiated, would not let me put the BP cuff on or get any vitals. when asked what was wrong, no response. when asked if she was in pain, she said \"no, just let me lay back down. \" she was making that grunting respiratory noise but stopped when asked to. niece expressed that this was not her usual behavior at all, but maybe it was from the spell she had earlier. she would continue to monitor and call us with any isues. ( this has happened several times before and now the niece just manages her at home instead of calling EMS \"because they never find anything when she goes to the hospital\". the patient can feed herself and eats about 3 small meals a day when encouraged. she has lost about 15lbs in 4 months.

## 2021-10-05 NOTE — HOSPICE
The following standard precautions for infection control were utilized: Face Mask, Safety Glasses and Gloves.

## 2021-10-05 NOTE — HOSPICE
SN placed phone call to caitlyn to schedule visit for today- Charmaine Saldivar very thankful for the phone call and visit. She reports that the patient has been awake all night and neither one has gotten any rest. The patient does not know who the neice is and is trying \"to pack up her stuff to go back to [place]\". The neice asked about LTC or Respite care placement as she is exhausted and needing more help. Contact Kev Lee with the above information for assistance- will call Judith back after visit with a more informative assessment of the situation and needs. Greeted at the door by the neice and she explained current situation. Pt is paranoid has been awake for over 24 hours, does not know who her neice is, she is seeing people that are not present and talking with them as well as hearing things that are not present, and saying \"to get me out of here\". Pt cooperative with the nurse and asking numerous questions and repeating herself asking \"who sent you\" Walt Barboza are you here\", however she is cooperative and no combative. Reviewed with patient the plan for today is to rest and take the medications that are going to arrive today. Reviewed with neice to use comfort medications- Haldol and Lorazepam once it arrives. Updated Kev Lee on the patients current status and the [facility] request for Respite care. Other than her mental status Ms Homero Mixon is ambulatory with walker, she is able to physically feed herself, and normally to use the toilet- she has however had episodes of incontinece lately. The neice reports no changes has occured recently that would have cause the decline mentally other than the recent admission to Hospice care.

## 2021-10-05 NOTE — HOSPICE
The following standard precautions for infection control were utilized:  Mask   performed initial assessment and provided pastoral care.  provided listening presence and discussed patient spiritual care needs. Patient was resting, caregiver shared/discussed family story. Caregiver accepted visits 1x monthly with PRN visits schedules as needed.

## 2021-10-05 NOTE — HOSPICE
Patient received sitting in her chair in the living room pleasant and alert. Care provided per established plan of care:  Yes. Patient with or without complaints during care provided. Patient is without complaint. Patient verbalized:  Patient appears to be talking to people that are not present asking them if they had everything they need. Patient kept on asking over and over from time to time. Family/caregiver requests:  No request.     Communicated to , Clinical Coordinator, or Director regarding patient/family/caregiver/aide findings: N/A     Status of patient upon end of hospice aid visit:  Patient is sitting up in her chair in the living room with her niece at her side.

## 2021-10-07 NOTE — HOSPICE
SN hospice visit. Iniitial  visit. Flash is in bed sleeping at time of visit. Mohinises to name. She is pleasant but clear that she wishes to stay in bed and go back to sleep. HHA coming in next 1 hour per Josias brady. Patient denies pain at this time or shortness of breath. Ayden Rivera states that haldol has been effective in calming patient and she is sleeping most of the night. She is reasonable and behaviors are controlled. She is eating small meals with encouragment of caitlyn. She is able to ambulate with walker and supervision to bathroom 15 feet from bed. Reviewed fall prevention and need for standby assist with transfers due to patient inability to see. Grecia verbalizes understanding. Reviewed all meds with Josias Reese. She is able to verbalize proper indicaitons and administration of all meds. Reviewed hospice philosophy and services available for patient. Grecia verbalizes understanding. Ordered Morphine conc, haldol conc and tylenol ES for delivery by [facility] tomorrow  Ordered supplies -  2 basins, chux pads, pullups, toothettes, shampoo/body wash, gloves, A and D, zince paste- for delivery tomorrow. Left mouth swabs in home. Advised to call for further needs. NEXT SN visit on Monday. Hospice number on fridge and comfort meds inside of fridge. No further needs at this time.

## 2021-10-08 NOTE — HOSPICE
Patient received coming out of the bathroom with her walker. Care provided per established plan of care:  Yes    Patient with or without complaints during care provided. Patient is without complaint.     Patient verbalized N/A    Family/caregiver requests N/A    Communicated to , Clinical Coordinator, or Director regarding patient/family/caregiver/aide findings N/A    Status of patient upon end of hospice aid visit Patient is sitting in her chair in the living room

## 2021-10-12 NOTE — HOSPICE
SN hospice visit to assess comfort and needs of patient. Patient is sitting up in chair in living room. Liz Doshi, is present. Patient is pleasant and denies pain or shortness of breath. Engages in pleasant conversation. States she sleeps well and eats well. Dayana Jaramillody states that patient gets up 4-5x at night to go to bathroom and then is not able to urinate. States that this has been going on for a long time. No issues during the day and no signs of kidney infection noted. Discussed with Dr. Henny Hansen and received order for ditropan. Ordered from [facility]. Educated neice on proper use of meds, fall prevention, infection control, hospice philosophy. Also discussed pain caregivers as Varnajose Geller feels that she may need some asstance at night and is willing to pay out of pocket. MSW notified of needs. No supply needs at this time  Discussed [facility] for ease of use at night- neice declines at this time. No further needs at this time.

## 2021-10-12 NOTE — HOSPICE
Patient received sitting in her chair in the living room area. Care provided per established plan of care:  Yes. Patient with or without complaints during care provide: Patient is without complaint. Patient verbalized that she was cold and to hurry up with her bath. Home did not appear to be cold but Royce Lewislintock, patient caregiver, turned up the heat for the patient . Family/caregiver requests to speak to patient .  KATIUSKA Deutsch notified. Communicated to , Clinical Coordinator, or Director regarding patient/family/caregiver/aide findings:  Communicated to . Status of patient upon end of hospice aid visit:  Patient is sitting at the kitchen table about to eat her supper.

## 2021-10-15 NOTE — HOSPICE
SN hospice visit - second of the week- to assess comfort and needs of patient. Patient is sitting up in recliner awake and alert. Carolyn Higginbotham is present. She states that patient continues to get up to go to the bathroom every 2.5 hours during the night. She is frustrated and states she cannot get sleep. States she will make patient sit up all day in recliner despite her asking to lay down in order to have her sleep. Discussed disease process and the possibility that patient has switched her days and nights and may be out of her control. Sandralee Mask admits that she is losing patience with care. Has not yet called private caregivers but intends to soon. States she has one month of silvercare and hopes that will start soon. Requested MSW contact Sandralee Mask to assist with caregiving issues. Discussed insomnia with Dr. Claudine Olson. He ordered Trazadone 50 mg at bedtime. Ordered from [address] [employer]. No falls or injuries reported. Flash continues to sleep in queen bed in living room. Ebony Batters that HHA is not able to bathe her in that bed any longer due to strain on back. Sandralee Mask is not agreeable to hospital bed at this time. Advised that HHA willl be discontinued until hospital bed is in place. Education provided on fall prevention, hospice philosoophy, proper use and administration of all med. No supply needs at this time.

## 2021-10-15 NOTE — HOSPICE
Patient received coming out of the bathroom with her walker. Care provided per established plan of care:  Yes    Patient with or without complaints during care provided  Patient without complaints. Patient verbalized that she was cold. Caregiver turned up the heat a little. Family/caregiver requests:  No request.    Communicated to , Clinical Coordinator, or Director regarding patient/family/caregiver/aide findings N/A    Status of patient upon end of hospice aid visit:  Patient laying in bed in the supine position with her eyes closed.

## 2021-10-15 NOTE — HOSPICE
The following standard precautions for infection control were utilized: Safety Glasses, Face Mask and Gloves.

## 2021-10-19 NOTE — HOSPICE
SN hospice visit to assess comfort and needs of patient     Tessy Posada reports that patient has had 4 periods of staring off into space and being non responsive to voice in past day. States she had these incidents last year and doctor attributed it to electrolyte imbalance. Patient is eating and drinking well in home at this time. Lorazepam was administered as directed twice before bedtime last night. Tessy Posada reports that patient slept most of the night but would wake and ask to go to the bathroom every 1-2 hours. She was incontinent of urine during the night and Tessy Posada reports urine was dark and foul smelling. Discussed with Dr. Debbie Fraser and received order for Keflex bid x 7 days - Ordered from [facility] for delivery tomorrow. Patient is supine in bed at time of visit. She is in no acute distress and denies pain or shortness of breath. Patient is oriented to place and time and states she is tired. She sleeps during assessment and SN visit. Tessy Posada reports that patient had large bowel movement yesterday. No further seizures after lorazepam administered at 8pm.   Educated on signs of UTI and treatment initiated. Educated on hospice philosophy, seizure precautions and mangment, proper indications and administration of all meds, fall prevention. Advised to start Trazadone for sleep tonight. Advised to let patient rest today and offer food and fluids but don't force if not interested. No supply needs. Tessy Posada is calling [facility] today for further assistance. Pasquale Earing to call if needed and hospice number is posted on fridge. No further needs at this time. Will be seen again by SN on Friday.

## 2021-10-21 NOTE — HOSPICE
SN hospice visit. Phone call to hospice from Robyn to report that patient has been awake all night and talking. Robyn states she got one trazadone at bedtime and has only had one dose of antibiotic yesterday. Keflex administered with lorazepam 1 mg oral concentrate. Patient refusing to eat. Robyn states she has not slept all night and is also agitated. States she has called [facility] for assistance and they are willing come and assist patient. Robyn said she will follow up with them today. Jory Perdomo will start next weeks for 4 hours daily. Offered respite but declined at this time. Educated Robyn on disease process and symptoms that patients exhibit with UTIs. Patient lethargic and willing to lay down after 20 minutes. She is noted to be relaxed and pleasant but still talking intermittently. Floyd Yoo to continue to administer lorazepam 0.5ml SL every 4 hours today and continue with antibiotic. Offer food and water when awake and toilet as needed. She verbalizes understanding and expresses confidence in ability to care for patient at this time. Agrees to call hospice if she is unable or unwilling to continue care for patient.    No other needs at this time

## 2021-10-21 NOTE — CASE COMMUNICATION
Phone call to javier Stone and caregiver for patient Arabella Smith  She states that she can no longer care for patient at this time. Patient is being treated for a UTI and confusion has increased over the past 24 hours. She did not sleep at all last night per Feli Rush. I visited the patient this a.m. and she was pleasant but very talkative and confused. She was not trying to move from her bed. I administered lorazepam 1mg SL and she was asleep when I left 1/2 hour later. I provided paid caregiver information 2 weeks ago and Feli Adri says she has the ability to pay but has not yet obtained care. I offered respite this am when Feli Rush seemed to be very agitated but she declined. Per this phone call this afternoon at 16:40, Feli Rush states we have to get her out of her house tomorrow as she states she can't take it anymore. I advised that she administer Morphine0.5ml and Lorazepam 0.5 ml SL immeidately to ensure that she does not have unaddressed pain or discomfort. Advised proper administration throughout the night. Advised to call if at any point she becomes unable to provide care. Advised her that I would discuss respite with IDT and that I would see her first thing tomorrow morning. She was agreeable with this plan at this time.

## 2021-10-22 NOTE — HOSPICE
SN hospice visit to assess comfort and needs of patient. Flash is in bed and is awake. She is noted to chatter but non sensical at this time. Niece is present. States that patient seems to have calmed during the night. She is using diapers at this time and patient is no longer trying to get out of the bed. No signs of distess. Nitiana wishes to go forward with respite during which time she will work with caregiver list providedi to secure additional care in the home for patient. Jyoti Tony that respite is for 5 nights and that we would transport to and from facility. Advsied that she must accept patient back at end of 5 days into safe environment and she verbalizes understanding and will have addiitional help secured by that time. No further needs at this  time. Orders and med list faxed to Dr. Cruz Huang for signature and to be faxed by office to [facility] when bed assigned.

## 2021-10-23 NOTE — HOSPICE
Pt received in bed, resting with eyes closed. Facility staff report no concerns, medications being verified by pharmacy and should be on site tonight. Staff requested copy of DDNR and received a copy. 24 hour hospice number reinforced.

## 2021-10-26 NOTE — HOSPICE
Patient resting in bed with eyes closed with fall mat in place. Patient appears anxious/agitated; frequently calling out for \"Lucy\" and yelling out \"help me. \"  She was also yelling out for \"help me Lord\". Patient behavior indicators negative for pain and discomfort. Patient fidgeting with hands under the covers. Patient starts exclaiming, \"I can't lift up a finger, help me. \"  Then she repeats \"help me\" for about 5 minutes. Also yells, \"Lucy, help me\" in an agitated tone. Patient skin hot to touch. Temperature 99.8 temporal.  Patient has 3 blankets on and the room temperature is 77. Attempted to remove some blankets, patient pulled them back up. Writer changed room temperature to 72 degrees. Writer informed Santi Patten RN about patient temperature and agitation/anxiety. She states that patient has not received any Ativan, but that she will bring some in. She also informed this nurse that \"this morning they got a temperature of 98.6 on her forehead. \"  Writer informed her that this nurse cut down the temperature of the room as the patient did not want covers removed and asked her to monitor. Patient has Ensure bed side with approximately 1/4 of 8oz carton consumed.

## 2021-10-27 NOTE — HOSPICE
SN hospice visit to assess comfort and needs of patient who has just returned home from 5 days of respite at [facility]. Mrs. Andreea Cole is supine in hospital bedt at time of visit. She has her eye closed and they remain closed for duration of visit. She is noted to call out for javier, Josias Reese, several times during this visit but was easily appeased when javier answered. Javier states that she has just changed patient who is now incontinent of urine and bowel. Facility staff reports that patient had a bowel movment on Tuesday and was total care for all adls during her stay. Also reports that she only ate 25% of what was offered and fed to her. Josias Reese states she is rested and has R [address]ail[address] in place for 4 hours on Tues and Thursdays. She is requesting HHA be restarted on alternate days if possible. All meds reviewed and reconciled. Chux pads ordered for patient  NO med needs at this time. Educated on hospice philosophy, disease process, bowel regimen, anxiety regimen, 24 hour nurse support phone number. Educated on fall and injury prevention. Grecia verbalizes understanding. NO further needs at this time.

## 2021-10-31 NOTE — HOSPICE
The following standard precautions for infection control were utilized: [place]. Patient received: IN BED, COMFORTABLY POSITIONED. Care provided per established plan of care: YES    Patient without complaints during care provided. Patient verbalized: C/O LEG PAIN    Family/caregiver requests: LINEN CHANGED. Communicated to , Clinical Coordinator, or Director regarding patient/family/caregiver/aide findings: N/A    Status of patient upon end of hospice aid visit: iN BED, RESTING COMFORTABLY.

## 2021-11-02 NOTE — HOSPICE
SN hospice visit to assess comfort and needs of patient. Patient is supine in bed at time of visit. She has her eyes closed. Neighbor is persent watching patient while Niece is at [address] [place] [address] appointment. Spoke to Niece on phone prior to visit concerning patient. Debo states that patient has been sleeping much of the time but is eating small amounts of yogurt and cereal 1-2x daily. She reports occasional coughing with eating. Discussed risk of aspiration and ensuring that patient is fully awake and upright with all feedings or po intake. Grecia verbalizes understanding. Lungs are clear at this time. PO 91%. Royce Gordy states that patient occasionally becomes agitated and she is administering morphine and lorazepam 1-2x daily. Advised use of lorazepam 2 x daily to lower seizure threshold and help with comfort. May use more often as needed. No falls or injuries reported. Educated on fall prevention, hospice philosophy, hospice services available including volunteer, skin care to maintain integrity. Skin is intact at this time. Discussed bowel regimen and pain regimen. Ordered diapers, chux pads, toothettes from [employer] for delivery tomorrow. No meds needed at this time. Aguilar Gonsales to call if needs prior to next visit on Friday. Markos Martinez understanding and hasn o further needs at this time.

## 2021-11-03 NOTE — HOSPICE
Patient received laying in her hospital bed in the supine position with her eyes closed. Care provided per established plan of care:  Yes. Patient with or without complaints during care provided. Patient without complaint. Patient verbalized:  Patient constantly calling her niece Parminder Cast who was standing at her bedside observing the bath and change of linens. Family/caregiver requests N/A Communicated to , Clinical Coordinator, or Director regarding patient/family/caregiver/aide findings N/A Status of patient upon end of hospice aid visit:  Patient repositioned on her left side in her hospital bed with her eyes closed.

## 2021-11-06 NOTE — HOSPICE
Patient received laying in her hospital bed in the supine position with her eyes closed. Care provided per established plan of care:  Yes. Patient with or without complaints during care provided. Patient without complaint. Patient verbalized N/A Family/caregiver requests N/A Communicated to , Clinical Coordinator, or Director regarding patient/family/caregiver/aide findings N/A Status of patient upon end of hospice aid visit:  Patient laying on her left side in her hospital bed with her eyes closed.

## 2021-11-08 NOTE — HOSPICE
on arrival of skill nurse, patient was lying in bed asleep. Vitals not obtain due to patient refusal. No reports or signs of pain. No discomforted noted /  reported. Medications reviewed. Supplies order- Lg  Briefs, gloves, carli pads, wipes.

## 2021-11-10 NOTE — HOSPICE
SN hospice visitt to assess comfort and needs of patient. Patient is supine in bed at time of visit. She is noted to have eyes closed and does respond to loud verbal questions. NIece, Mily Wade is present at time of visit. No signs of pain or shortness of breath. Mily Wade states that patient is sleeping all day and up off and on during the night. States she does not try to get out of bed but moves around and calls out her name. Reviewed proper indications and administraiton of all meds including Seroquel which will be started tonight. Grecia verbalizes understanding. Written instructions left in the home as well. Patient is eating jello and applesauce as well as pudding off and on several times a day. Tried mac and cheese but patient noted to pocket. Drinking without coughing or diffcculty. Paitent is now spoon fed by Mily Wade. Skin is intact. Educated on turning and toileting to prevent skin breakdown. Educted on disease process and hospice philosophy. Mily Wade in agreement and verbalizes understanding. States she has contacted [facility] and will start using them for assistance wihen time with Nati Foster is over in 2 weeks. HHA from hospice providing care 2x week. Mily Wade states she is adjusting to new routine and feels supported. No further needs at this time. Ordreed 3 packs of chux pads  Ordered Seroquel, MS conc and haldol yesterday for patient. Advised to call hospice for further needs. Next visit on Thursday.

## 2021-11-11 NOTE — HOSPICE
Patient received sitting up in her hospital bed with her eyes opened and was eating pudding. Patient javier Cid was at bedside feeding patient. Care provided per established plan of care:  Yes    Patient with or without complaints during care provided:  Patient without complaint. Patient verbalized N/A    Family/caregiver requests N/A    Communicated to , Clinical Coordinator, or Director regarding patient/family/caregiver/aide findings   N/A    Status of patient upon end of hospice aid visit:  Patient laying in her hospital bed on her left side with her eyes closed. Patient appears to be comfortable.

## 2021-11-11 NOTE — HOSPICE
SN hospice visit to assess comfort and needs of nathen. Patient is awake and alert at time of visit. She responds that she is \"fine\" when asked how she is doing. She then closes her eyes and does not engage in further communication with SN or niece during this visit. She is noted to have a low grade fever of 100.4 and niece reports that she has been coughing up some phlem that she swallows. Unsure of color. Lungs are clear bilaterally and no signs of shortness of breath or distress. No BM since 11/8. Advised adminsitration of dulcolax suppository if no BM before tomorrow. Maryanne Brunner understanding. No reports or signs of injury noted. Mal Iron states that patient is eating some small bites of applesauce and pudding. Oftern has to be reminded to chew and swallow and seems to not understand how to swallow water or liquids. Advised ensuring that patient is upright and alert before attempting to feed or offer liquids. Grecia verbalizes undestanding. Discussed hospice philosophy in relation to fever and suggested cool cloth and repositioning for comfort. Advised to continue administration of haldol and morphine as needed for comfort. Grecia verbalizes understanding. She expresses that she is getting rest and feel confident in her ability to care for patient at this time. No supplies or meds needed  Floyd Yoo to call if further needs prior to next visit.

## 2021-11-12 NOTE — HOSPICE
SN prn visit as patient has developed a fever. It has been reported that paid caregiver through [facility] is unvaccinated but is showing no signs of infection. [facility] is requiring a negative COVID test before resuminig thier services. Mrs. Robert Mclain has purchased a home test from [employer] and is requesting assistance with instructions for testing. COVID test performed per instructions and is negative. Letter provided to caregiver that test is negative at this time but test are not always accurate. Paitnet has some chest congestion with no fever this a.m. .  CG states slept well last night - no distress. Mrs. Flavia Almendarez is sleeping quietly at time of visit. Educated on infection control, toileting and skin care, hospice philosophy. No further needs at this time.

## 2021-11-15 NOTE — HOSPICE
SN hospice visit to assess comfort and needs of patient. Patient is supine in bed with eyes closed at time of visit. Does not open eyes to call of name but does pull back hands and legs wehen being examined. Does not follow commands for SN. Niece is present and states that patient is sleepng most of the time - occasionally calls out for help getting out of bed at night but goes back to sleep quickly when addressed by niece. She remains incontinent of urine and bowel. No bm x several days. Jet Cid instructed to administer dulcolax supposotiory if no BM today. She states coffee given this a.m and hoping for results. Discussed bowel regimand Jet Cid verbalizes understanding. Also discussed skin care to prevent breakdown and Grecia verbalizes understanding. Skin is intact at this time. No falls or injuries noted and patient remains bedbound. Jet Cid states that sunitha eats 2-3 pudding cups and applesauce daily. States that she puts her upright in bed but patient often coughs with feeding. Lungs are clear and PO is 94% but patient has wet productive cough. Discussed aspiration precautions and ensuring that patient is fully awake/alert with feeding . She verbalizes understanding. Jet Cid states she is in touch with [employer] for some assitance with care especially in the evening. She states she is feeling supported and is confident in her ability to provide care for her aunt at this time. MSW present at end of visit. Meds - Lorazepam and seroquel ordered for refill  Supplies - wipes, briefs and chux ordered for delivery  Advised to call hospice for any further needs. Grecia verbalizes understanding.

## 2021-11-15 NOTE — HOSPICE
Patient received laying in her hospital bed with her eyes closed. Care provided per established plan of care:  Yes    Patient with or without complaints during care provided. Patient without complaint. Patient verbalized N/A    Family/caregiver requests N/A    Communicated to , Clinical Coordinator, or Director regarding patient/family/caregiver/aide findings N/A    Status of patient upon end of hospice aid visit, patient in her hospital bed positioned on her left side with her eyes closed. Patient appears to be comfortable.

## 2021-11-17 NOTE — HOSPICE
SN hospice visit to assess comfort and needs of patient. Second visit of the week. Patient is supine in bed at time of visit. She has her eyes closed and does not open during this visit. She does say \"hello\" with verbal and tactile prompting of patient but does not engage further. Does not follow commands. Lungs are clear with PO of 98% on room air. Lungs are clear. CG states that cough has decreased over past days. She continues to eat pudding and applesauce that is spoon fed to her. CG states she is upright and awake with feelings. Disucssed aspiration precautions. Skin remains intact. Floated heels and educated caregiver on floating heels to decrease pressure as well as turning and repositioning, use of barrier cream and prompt toileting. Also discussed bowel regimen and advised to use dulcolax suppository today if no bm. She verbalizes understanding. No medication or supply needs at this time. Educated on hospice call number. Advised to call if further needs prior to next visit on Monday, 11/22/21.

## 2021-11-18 NOTE — HOSPICE
Patient received laying in her hospital bed in the supine position with her eyes closed. Care provided per established plan of care:  Yes    Patient with or without complaints during care provided. Patient without complaints. Patient verbalized N/A    Family/caregiver requests N/A    Communicated to , Clinical Coordinator, or Director regarding patient/family/caregiver/aide findings N/A     Status of patient upon end of hospice aid visit:  Patient laying in her hospital bed positioned on her left side with her eyes closed.

## 2021-11-20 NOTE — HOSPICE
Patient received laying in her hospital bed with her eyes opened. Care provided per established plan of care:  Yes. Patient with or without complaints during care provided. Patient is without complaints. Patient verbalized:  Patient calling for [employer]". Alejandro Quarles is patient niece and caregiver. Family/caregiver requests:  N/A    Communicated to , Clinical Coordinator, or Director regarding patient/family/caregiver/aide findings N/A    Status of patient upon end of hospice aid visit: Patient laying in her hospital bed positioned on her left side with her eyes closed. Patient appears to be comfortable.

## 2021-11-22 NOTE — HOSPICE
SN hospice visit to assess comfort and needs of paitent. Patient is supine in bed with eyes closed and does not respond to SN verbal commands- She is noted to pull at covers when uncovered for assessment. No signs of pain or discomfort noted. Niece, Urbano Guallpa, is present. States that patient sleeps all day and 3-4 hours at night but when awake just noted to pick at covers or diaper - rarely calls out for niece. She is totally bed bound and dependent for all adls - she is spoon fed 3-4 cups of pudding or applesauce daily with some coughing reported with feeding. Lungs are clear with pulse ox of 92% on room air. Discussed aspiration precautions with Urbano Guallpa and she verbalizes understanding. Urbano Guallpa states that patient seems to be comfortable and she is satisifed with current level of comfort with current regimen. No BM in 5 days. Advsied use of dulcolax suppository. Will reassess on next visit. Reviewed proper indications and administration of all meds including pain and bowel regimen. No meds or supplies needed at this time. No further needs.

## 2021-11-27 NOTE — HOSPICE
Entered pt house and saw pt resting in bed w/ NAD noted. Denis Maria states that she has been giving pt Ativan to help out with agitation. Pt resting w/ eyes closed w/ non verbal and behavioral indicators of discomfort absent. Pt had not had a BM in 2-3 weeks per caitlyn and she states she tried giving her a suppository but this did not work. Attempted fleets enema w/ no results and then on inspection could feel hard stool and attempted to disimpact but pt could not tolerate this. Suppository given. Mily Pompa RN updated on what was done during the visit. Seroquel ordered for pt. No supply needs. Pt did seem a bit more agitated after the assessment and attempt to disimpact and recommended pt to have Ativan if she had not had this recently. Niece stated she had not and she would give her a dose. Pt repositioned to a position of comfort. No further questions or concerns. Exited home.

## 2021-11-27 NOTE — HOSPICE
Patient received laying in the supine position in her hospital bed with her eyes closed calling \"Harriet Paige". Gracia Cha is patient niece and caregiver. Care provided per established plan of care:  Yes. Patient with or without complaints during care provided. Patient is without complaint. Patient verbalized calling Hermelinda Long"  and when patient niece went over to patient bedside and asked patient what she needed patient said \"nothing\". Patient niece gave patient her medication for agitation. Patient niece said that patient has been calling her for the past hour and each time she asked patient what is wrong or what do she wants patient always respond \"Nothing\". Family/caregiver requests N/A    Communicated to , Clinical Coordinator, or Director regarding patient/family/caregiver/aide findings N/A    Status of patient upon end of hospice aid visit. Patient laying in her hospital bed with her eyes closed positioned on her left side. Patient appears to be comfortable.

## 2021-11-30 NOTE — HOSPICE
SN hospice visit to assess comfort and needs of patient. Patient is supine in bed at time of visit. Niece, Alicia Brannon, is present. Patient does not respond to SN voice or touch but does attempt to follow commands of turning and putting arms in new gown. She keeps her eyes closed throughout this visit. Alicia Brannon states patient is eating less - only a yogurt and applesauce on average and continues to pocket some of her food in her mouth. Is able to take small sips of water and is accepting of liquid medications. Alicia Brannon reports that enema last week was effective and patient had 2 large bowel movments followed by 2 smaller movments. Abdomen is soft and non distended. Reviewed new bowel regimen of Senna extract with Alicia Brannon and she verbalizes understanding. Skin remains intact. No falls or injuries reported. Lungs are clear with coughing. Patient contnues to cough up and swallow thick valencia secretions. Alicia Salud reports that she has paid caregiver from [employer] starting 3 days a week on Friday. She reports being tired but confident in her ability to continue to provide care for patient. Patient bathed by  and linens and clothing changed. Patient tolerated well. Meds reviewed,  Education provided on skin care, pain regimen, bowel regimen, safe turning with draw sheet, disease process and hospice philosophy. Advised to call hospice call number for any needs prior to next visit. Call number posted on fridge. Ordered large briefs, chux pads, large gloves zinc paste and toothettes  Seroquel refilled through [facility]  No further needs at this time.

## 2021-12-04 NOTE — HOSPICE
SN hospice visit - second of the week - to assess comfort of patient. Patient is supine in bed with niece at bedside. She is picking and diaper and pulling at covers. Niece states that she gave her Senna Extract last night and is probably getting ready to have a bm. Bowel sounds are active. Suggested administration of lorazepam to help with anxiety. Adminsitered in my presence. Educated on bowel regimen, skin care, safe turning, hospice philosophy and call if needs.     No supplies needed  Ordered refill or lorazepam

## 2021-12-08 NOTE — HOSPICE
SN Prn visit to assess patient - over 30 hours without urine output. Patient is supine in bed at time of visit. She keeps her eyes closed and does not meaningfully respond to SN during this visit. She is noted to pick at covers and diaper. Abdomen is soft and non distended at this time. Niece reports no urine output since Monday night around midnight. Discussed catheter and recieved order from Dr. Claudine Olson. Whitaker catheter inserted- 16 french and 10ml- without difficulty. Immediate return or clarence urine - 500 ml Patient tolerated well. Catheter secured to left thigh. Niece administered 0.5 ml SL lorazepam to help patient relax. Education provided on whitaker care, infection control, emptying of bag. Niece verbalizes understanding. Advised to use lorazepam as needed for agitation. Advised to call hospice for further needs prior to next SN visit on Friday.

## 2021-12-10 NOTE — HOSPICE
Medication refills ordered this visit: seroquel 25 mg, MS concentrate - to be delivered 12/4    Medications reconciled and all medications are available in the home this visit. The following education was provided regarding medications, medication interactions, and look alike medications: Lorazepam and morphine may be adminstered together for comfort of patient. Continue to crush and administer seroquel at bedtime as directed  Response to teaching: Laura lujan. . Medications are effective at this time. Three syringes of MS concentrate 0.5 ml  and three syringes of Lorazepam concentrate 0.5 ml  predrawn for ease of administration by caregivers as needed. Supplies by type and quantity ordered this visit include: no supplies needed at this time. Consulted medical director/attending physician regarding: no needs at this time    Instructed patient/family/caregiver on 24-hour hospice availability and phone number. Plan for next visit:  Assess patient for comfort with current regimen. Assess for urinary retention. Catheter removed on this visit due to irritation of perineum and continued agitation of patient with catheter. Vira Gastelum to call if no urine output in 24 hour period with signs of discomfort/agiation of patient.

## 2021-12-14 NOTE — HOSPICE
Patient supine in bed at time of visit. with paid caregiver and niece present. Patient is non responsive to tactile stimuli. Mol states patient stopped responding yesterday and is no longer able to eat. Discussed end of life processes - Advised to do oral care only due to risk of aspiration with placing water in mouth of unresponsive patient. She verbalizes understanding. Had bowel movment this a.ml and is passing urine without difficulty. Educated on oral care, skin care, repositioning, proper administration of meds. - advised regular administration of lorazepam and morphine for comfort of patient. Discussed terminal secretions and managment with levsin and repositioning with head down and on side. Grecia verbalizes understanding. Advised that HHA will be increased to 3x week and SN will be daily through this process. No meds or supplies needed at this time.

## 2021-12-16 VITALS — OXYGEN SATURATION: 80 % | HEART RATE: 100 BPM | TEMPERATURE: 97.3 F | RESPIRATION RATE: 18 BRPM

## 2021-12-16 NOTE — HOSPICE
SN hospice visit as patient is imminent  Patient is unresponsive to verbal or tactile stimuli. Stacey is at bedside. States patient has not responded since 2 days ago. She is providing oral care and repositioning every 3-4 hours. Patient has had no urine output x 12 hours. Abdomen is soft with no bladder distention. No signs of pain or shortness of breath. Educated on end of life processes and procedure to follow at time of death. Grecia verbalizes understanding. No med or supplies needed  No further needs at this time.